# Patient Record
Sex: FEMALE | Race: BLACK OR AFRICAN AMERICAN | Employment: FULL TIME | URBAN - METROPOLITAN AREA
[De-identification: names, ages, dates, MRNs, and addresses within clinical notes are randomized per-mention and may not be internally consistent; named-entity substitution may affect disease eponyms.]

---

## 2018-10-12 ENCOUNTER — HOSPITAL ENCOUNTER (OUTPATIENT)
Age: 53
Discharge: HOME | End: 2018-10-12
Payer: MEDICARE

## 2018-10-12 LAB
ALBUMIN SERPL-MCNC: 4.1 G/DL (ref 3.5–5.2)
ALP BLD-CCNC: 69 U/L (ref 35–104)
ALT SERPL-CCNC: 22 U/L (ref 0–32)
ANION GAP SERPL CALCULATED.3IONS-SCNC: 19 MMOL/L (ref 7–16)
AST SERPL-CCNC: 14 U/L (ref 0–31)
BASOPHILS ABSOLUTE: 0.04 E9/L (ref 0–0.2)
BASOPHILS RELATIVE PERCENT: 0.4 % (ref 0–2)
BILIRUB SERPL-MCNC: 0.2 MG/DL (ref 0–1.2)
BUN BLDV-MCNC: 11 MG/DL (ref 6–20)
CALCIUM SERPL-MCNC: 9.2 MG/DL (ref 8.6–10.2)
CHLORIDE BLD-SCNC: 102 MMOL/L (ref 98–107)
CHOLESTEROL, TOTAL: 314 MG/DL (ref 0–199)
CO2: 21 MMOL/L (ref 22–29)
CREAT SERPL-MCNC: 0.8 MG/DL (ref 0.5–1)
EOSINOPHILS ABSOLUTE: 0.06 E9/L (ref 0.05–0.5)
EOSINOPHILS RELATIVE PERCENT: 0.6 % (ref 0–6)
GFR AFRICAN AMERICAN: >60
GFR NON-AFRICAN AMERICAN: >60 ML/MIN/1.73
GLUCOSE BLD-MCNC: 69 MG/DL (ref 74–109)
HCT VFR BLD CALC: 42.4 % (ref 34–48)
HDLC SERPL-MCNC: 39 MG/DL
HEMOGLOBIN: 13.3 G/DL (ref 11.5–15.5)
IMMATURE GRANULOCYTES #: 0.07 E9/L
IMMATURE GRANULOCYTES %: 0.7 % (ref 0–5)
LDL CHOLESTEROL CALCULATED: 211 MG/DL (ref 0–99)
LYMPHOCYTES ABSOLUTE: 2.6 E9/L (ref 1.5–4)
LYMPHOCYTES RELATIVE PERCENT: 24.3 % (ref 20–42)
MCH RBC QN AUTO: 26.6 PG (ref 26–35)
MCHC RBC AUTO-ENTMCNC: 31.4 % (ref 32–34.5)
MCV RBC AUTO: 84.8 FL (ref 80–99.9)
MONOCYTES ABSOLUTE: 1.14 E9/L (ref 0.1–0.95)
MONOCYTES RELATIVE PERCENT: 10.7 % (ref 2–12)
NEUTROPHILS ABSOLUTE: 6.79 E9/L (ref 1.8–7.3)
NEUTROPHILS RELATIVE PERCENT: 63.3 % (ref 43–80)
PDW BLD-RTO: 14.1 FL (ref 11.5–15)
PLATELET # BLD: 286 E9/L (ref 130–450)
PMV BLD AUTO: 9.9 FL (ref 7–12)
POTASSIUM SERPL-SCNC: 3.3 MMOL/L (ref 3.5–5)
RBC # BLD: 5 E12/L (ref 3.5–5.5)
SEDIMENTATION RATE, ERYTHROCYTE: 14 MM/HR (ref 0–20)
SODIUM BLD-SCNC: 142 MMOL/L (ref 132–146)
TOTAL PROTEIN: 7.2 G/DL (ref 6.4–8.3)
TRIGL SERPL-MCNC: 320 MG/DL (ref 0–149)
VITAMIN D 25-HYDROXY: 18 NG/ML (ref 30–100)
VLDLC SERPL CALC-MCNC: 64 MG/DL
WBC # BLD: 10.7 E9/L (ref 4.5–11.5)

## 2018-10-12 PROCEDURE — 82306 VITAMIN D 25 HYDROXY: CPT

## 2018-10-12 PROCEDURE — 80061 LIPID PANEL: CPT

## 2018-10-12 PROCEDURE — 85025 COMPLETE CBC W/AUTO DIFF WBC: CPT

## 2018-10-12 PROCEDURE — 36415 COLL VENOUS BLD VENIPUNCTURE: CPT

## 2018-10-12 PROCEDURE — 85651 RBC SED RATE NONAUTOMATED: CPT

## 2018-10-12 PROCEDURE — 80053 COMPREHEN METABOLIC PANEL: CPT

## 2019-01-31 ENCOUNTER — HOSPITAL ENCOUNTER (OUTPATIENT)
Age: 54
Discharge: HOME | End: 2019-02-02
Payer: MEDICARE

## 2019-01-31 PROCEDURE — 88304 TISSUE EXAM BY PATHOLOGIST: CPT

## 2019-01-31 PROCEDURE — 88311 DECALCIFY TISSUE: CPT

## 2019-02-04 ENCOUNTER — ANESTHESIA EVENT (OUTPATIENT)
Dept: ENDOSCOPY | Age: 54
End: 2019-02-04
Payer: COMMERCIAL

## 2019-02-05 ENCOUNTER — PREP FOR PROCEDURE (OUTPATIENT)
Dept: GASTROENTEROLOGY | Age: 54
End: 2019-02-05

## 2019-02-05 RX ORDER — 0.9 % SODIUM CHLORIDE 0.9 %
50 INTRAVENOUS SOLUTION INTRAVENOUS ONCE
Status: CANCELLED | OUTPATIENT
Start: 2019-02-05 | End: 2019-02-05

## 2019-02-06 ENCOUNTER — HOSPITAL ENCOUNTER (OUTPATIENT)
Age: 54
Setting detail: OUTPATIENT SURGERY
Discharge: HOME | End: 2019-02-06
Attending: INTERNAL MEDICINE | Admitting: INTERNAL MEDICINE
Payer: COMMERCIAL

## 2019-02-06 ENCOUNTER — ANESTHESIA (OUTPATIENT)
Dept: ENDOSCOPY | Age: 54
End: 2019-02-06
Payer: COMMERCIAL

## 2019-02-06 VITALS
RESPIRATION RATE: 27 BRPM | DIASTOLIC BLOOD PRESSURE: 103 MMHG | SYSTOLIC BLOOD PRESSURE: 177 MMHG | OXYGEN SATURATION: 99 %

## 2019-02-06 VITALS
DIASTOLIC BLOOD PRESSURE: 83 MMHG | RESPIRATION RATE: 18 BRPM | BODY MASS INDEX: 31.65 KG/M2 | SYSTOLIC BLOOD PRESSURE: 152 MMHG | HEART RATE: 99 BPM | OXYGEN SATURATION: 96 % | WEIGHT: 190 LBS | HEIGHT: 65 IN | TEMPERATURE: 97.2 F

## 2019-02-06 LAB — METER GLUCOSE: 106 MG/DL (ref 74–99)

## 2019-02-06 PROCEDURE — 7100000011 HC PHASE II RECOVERY - ADDTL 15 MIN: Performed by: INTERNAL MEDICINE

## 2019-02-06 PROCEDURE — 82962 GLUCOSE BLOOD TEST: CPT

## 2019-02-06 PROCEDURE — 2709999900 HC NON-CHARGEABLE SUPPLY: Performed by: INTERNAL MEDICINE

## 2019-02-06 PROCEDURE — 7100000010 HC PHASE II RECOVERY - FIRST 15 MIN: Performed by: INTERNAL MEDICINE

## 2019-02-06 PROCEDURE — 6360000002 HC RX W HCPCS: Performed by: NURSE ANESTHETIST, CERTIFIED REGISTERED

## 2019-02-06 PROCEDURE — 3609012400 HC EGD TRANSORAL BIOPSY SINGLE/MULTIPLE: Performed by: INTERNAL MEDICINE

## 2019-02-06 PROCEDURE — 3700000000 HC ANESTHESIA ATTENDED CARE: Performed by: INTERNAL MEDICINE

## 2019-02-06 PROCEDURE — 2580000003 HC RX 258: Performed by: NURSE ANESTHETIST, CERTIFIED REGISTERED

## 2019-02-06 PROCEDURE — 87081 CULTURE SCREEN ONLY: CPT

## 2019-02-06 RX ORDER — OXYCODONE AND ACETAMINOPHEN 7.5; 325 MG/1; MG/1
1 TABLET ORAL EVERY 4 HOURS PRN
COMMUNITY

## 2019-02-06 RX ORDER — 0.9 % SODIUM CHLORIDE 0.9 %
50 INTRAVENOUS SOLUTION INTRAVENOUS ONCE
Status: DISCONTINUED | OUTPATIENT
Start: 2019-02-06 | End: 2019-02-06 | Stop reason: HOSPADM

## 2019-02-06 RX ORDER — SODIUM CHLORIDE 0.9 % (FLUSH) 0.9 %
10 SYRINGE (ML) INJECTION EVERY 12 HOURS SCHEDULED
Status: DISCONTINUED | OUTPATIENT
Start: 2019-02-06 | End: 2019-02-06 | Stop reason: HOSPADM

## 2019-02-06 RX ORDER — PROPOFOL 10 MG/ML
INJECTION, EMULSION INTRAVENOUS PRN
Status: DISCONTINUED | OUTPATIENT
Start: 2019-02-06 | End: 2019-02-06 | Stop reason: SDUPTHER

## 2019-02-06 RX ORDER — SODIUM CHLORIDE 9 MG/ML
INJECTION, SOLUTION INTRAVENOUS CONTINUOUS PRN
Status: DISCONTINUED | OUTPATIENT
Start: 2019-02-06 | End: 2019-02-06 | Stop reason: SDUPTHER

## 2019-02-06 RX ORDER — SODIUM CHLORIDE 0.9 % (FLUSH) 0.9 %
10 SYRINGE (ML) INJECTION PRN
Status: DISCONTINUED | OUTPATIENT
Start: 2019-02-06 | End: 2019-02-06 | Stop reason: HOSPADM

## 2019-02-06 RX ADMIN — SODIUM CHLORIDE: 9 INJECTION, SOLUTION INTRAVENOUS at 14:19

## 2019-02-06 RX ADMIN — PROPOFOL 220 MG: 10 INJECTION, EMULSION INTRAVENOUS at 14:32

## 2019-02-06 ASSESSMENT — PAIN SCALES - GENERAL
PAINLEVEL_OUTOF10: 0

## 2019-02-06 ASSESSMENT — PAIN - FUNCTIONAL ASSESSMENT: PAIN_FUNCTIONAL_ASSESSMENT: 0-10

## 2019-02-08 LAB — CLOTEST: NORMAL

## 2019-02-13 ENCOUNTER — HOSPITAL ENCOUNTER (OUTPATIENT)
Age: 54
Discharge: HOME | End: 2019-02-13
Payer: COMMERCIAL

## 2019-02-13 LAB
AMYLASE: 42 U/L (ref 20–100)
BASOPHILS ABSOLUTE: 0.03 E9/L (ref 0–0.2)
BASOPHILS RELATIVE PERCENT: 0.3 % (ref 0–2)
EOSINOPHILS ABSOLUTE: 0.05 E9/L (ref 0.05–0.5)
EOSINOPHILS RELATIVE PERCENT: 0.5 % (ref 0–6)
HCT VFR BLD CALC: 43.4 % (ref 34–48)
HEMOGLOBIN: 13.7 G/DL (ref 11.5–15.5)
IMMATURE GRANULOCYTES #: 0.04 E9/L
IMMATURE GRANULOCYTES %: 0.4 % (ref 0–5)
LIPASE: 21 U/L (ref 13–60)
LYMPHOCYTES ABSOLUTE: 2.52 E9/L (ref 1.5–4)
LYMPHOCYTES RELATIVE PERCENT: 26.6 % (ref 20–42)
MCH RBC QN AUTO: 26.8 PG (ref 26–35)
MCHC RBC AUTO-ENTMCNC: 31.6 % (ref 32–34.5)
MCV RBC AUTO: 84.9 FL (ref 80–99.9)
MONOCYTES ABSOLUTE: 1.09 E9/L (ref 0.1–0.95)
MONOCYTES RELATIVE PERCENT: 11.5 % (ref 2–12)
NEUTROPHILS ABSOLUTE: 5.73 E9/L (ref 1.8–7.3)
NEUTROPHILS RELATIVE PERCENT: 60.7 % (ref 43–80)
PDW BLD-RTO: 14.1 FL (ref 11.5–15)
PLATELET # BLD: 280 E9/L (ref 130–450)
PMV BLD AUTO: 9.7 FL (ref 7–12)
RBC # BLD: 5.11 E12/L (ref 3.5–5.5)
WBC # BLD: 9.5 E9/L (ref 4.5–11.5)

## 2019-02-13 PROCEDURE — 80053 COMPREHEN METABOLIC PANEL: CPT

## 2019-02-13 PROCEDURE — 83690 ASSAY OF LIPASE: CPT

## 2019-02-13 PROCEDURE — 85025 COMPLETE CBC W/AUTO DIFF WBC: CPT

## 2019-02-13 PROCEDURE — 82150 ASSAY OF AMYLASE: CPT

## 2019-02-13 PROCEDURE — 36415 COLL VENOUS BLD VENIPUNCTURE: CPT

## 2019-02-18 LAB
ALBUMIN SERPL-MCNC: 4.2 G/DL (ref 3.5–5.2)
ALP BLD-CCNC: 71 U/L (ref 35–104)
ALT SERPL-CCNC: 16 U/L (ref 0–32)
ANION GAP SERPL CALCULATED.3IONS-SCNC: 15 MMOL/L (ref 7–16)
AST SERPL-CCNC: 17 U/L (ref 0–31)
BILIRUB SERPL-MCNC: 0.4 MG/DL (ref 0–1.2)
BUN BLDV-MCNC: 10 MG/DL (ref 6–20)
CALCIUM SERPL-MCNC: 9.6 MG/DL (ref 8.6–10.2)
CHLORIDE BLD-SCNC: 106 MMOL/L (ref 98–107)
CO2: 23 MMOL/L (ref 22–29)
CREAT SERPL-MCNC: 0.8 MG/DL (ref 0.5–1)
GFR AFRICAN AMERICAN: >60
GFR NON-AFRICAN AMERICAN: >60 ML/MIN/1.73
GLUCOSE BLD-MCNC: 66 MG/DL (ref 74–99)
POTASSIUM SERPL-SCNC: 4.7 MMOL/L (ref 3.5–5)
SODIUM BLD-SCNC: 144 MMOL/L (ref 132–146)
TOTAL PROTEIN: 7.5 G/DL (ref 6.4–8.3)

## 2019-02-20 ENCOUNTER — HOSPITAL ENCOUNTER (EMERGENCY)
Age: 54
Discharge: HOME | End: 2019-02-20
Attending: EMERGENCY MEDICINE
Payer: COMMERCIAL

## 2019-02-20 ENCOUNTER — APPOINTMENT (OUTPATIENT)
Dept: ULTRASOUND IMAGING | Age: 54
End: 2019-02-20
Payer: COMMERCIAL

## 2019-02-20 VITALS
DIASTOLIC BLOOD PRESSURE: 70 MMHG | HEIGHT: 65 IN | HEART RATE: 65 BPM | TEMPERATURE: 98.7 F | BODY MASS INDEX: 28.32 KG/M2 | OXYGEN SATURATION: 95 % | RESPIRATION RATE: 16 BRPM | SYSTOLIC BLOOD PRESSURE: 105 MMHG | WEIGHT: 170 LBS

## 2019-02-20 DIAGNOSIS — I82.411 ACUTE DEEP VEIN THROMBOSIS (DVT) OF FEMORAL VEIN OF RIGHT LOWER EXTREMITY (HCC): Primary | ICD-10-CM

## 2019-02-20 PROCEDURE — 93971 EXTREMITY STUDY: CPT

## 2019-02-20 PROCEDURE — 96372 THER/PROPH/DIAG INJ SC/IM: CPT

## 2019-02-20 PROCEDURE — 6360000002 HC RX W HCPCS: Performed by: NURSE PRACTITIONER

## 2019-02-20 PROCEDURE — 99283 EMERGENCY DEPT VISIT LOW MDM: CPT

## 2019-02-20 RX ADMIN — ENOXAPARIN SODIUM 80 MG: 100 INJECTION SUBCUTANEOUS at 19:07

## 2019-02-20 ASSESSMENT — PAIN SCALES - GENERAL: PAINLEVEL_OUTOF10: 10

## 2019-02-20 ASSESSMENT — PAIN DESCRIPTION - PAIN TYPE: TYPE: ACUTE PAIN

## 2019-03-07 ENCOUNTER — HOSPITAL ENCOUNTER (OUTPATIENT)
Dept: NUCLEAR MEDICINE | Age: 54
Discharge: HOME | End: 2019-03-07
Payer: COMMERCIAL

## 2019-03-07 VITALS — WEIGHT: 190 LBS | BODY MASS INDEX: 31.62 KG/M2

## 2019-03-07 DIAGNOSIS — R14.0 ABDOMINAL BLOATING: ICD-10-CM

## 2019-03-07 PROCEDURE — A9537 TC99M MEBROFENIN: HCPCS | Performed by: RADIOLOGY

## 2019-03-07 PROCEDURE — 2580000003 HC RX 258: Performed by: RADIOLOGY

## 2019-03-07 PROCEDURE — 78227 HEPATOBIL SYST IMAGE W/DRUG: CPT

## 2019-03-07 PROCEDURE — 6360000004 HC RX CONTRAST MEDICATION: Performed by: RADIOLOGY

## 2019-03-07 PROCEDURE — 3430000000 HC RX DIAGNOSTIC RADIOPHARMACEUTICAL: Performed by: RADIOLOGY

## 2019-03-07 RX ADMIN — Medication 6 MILLICURIE: at 10:33

## 2019-03-07 RX ADMIN — SODIUM CHLORIDE 1.72 MCG: 9 INJECTION, SOLUTION INTRAVENOUS at 11:39

## 2019-06-27 ENCOUNTER — HOSPITAL ENCOUNTER (OUTPATIENT)
Dept: NEUROLOGY | Age: 54
Discharge: HOME | End: 2019-06-27
Payer: COMMERCIAL

## 2019-06-27 VITALS — BODY MASS INDEX: 30.73 KG/M2 | HEIGHT: 64 IN | WEIGHT: 180 LBS

## 2019-06-27 PROCEDURE — 95886 MUSC TEST DONE W/N TEST COMP: CPT | Performed by: PHYSICAL MEDICINE & REHABILITATION

## 2019-06-27 PROCEDURE — 95912 NRV CNDJ TEST 11-12 STUDIES: CPT | Performed by: PHYSICAL MEDICINE & REHABILITATION

## 2019-06-27 PROCEDURE — 95886 MUSC TEST DONE W/N TEST COMP: CPT

## 2019-06-27 PROCEDURE — 95912 NRV CNDJ TEST 11-12 STUDIES: CPT

## 2019-06-27 NOTE — PROCEDURES
[address] [facility]  [address] [place] [address], [address] [place] [address]  Phone: [phone]  Fax: [phone]      Referring Provider: Vianney Mims DPM  Primary Care Physician: Serenity Singer MD  Patient Name: Karissa Brunner  Patient YOB: 1965  Gender: female  BMI: Body mass index is 30.9 kg/m². Height 5' 4\" (1.626 m), weight 180 lb (81.6 kg), not currently breastfeeding. 6/27/2019    Description of clinical problem:   Pain in the left foot, occasional paresthesias    Pain: Yes   ; Numbness/tingling: she reports occasional paresthesias; Weakness:  No  She endorses \"stiffness\" in the left foot     Brief physical exam:   Sensory deficit: numbness around surgical scars, otherwise SILT; Weakness: No; Atrophy: No      Study Limitations:  None    Motor NCS      Nerve / Sites Lat. Lat Diff Amplitude Amp. 1-2 Distance Velocity Temp.    ms ms mV % cm m/s °C   R Peroneal - EDB      Ankle 4.11  4.3 100 8  33.3      Pop fossa 12.86 8.75 4.0 94.1 38 43 33.3   L Peroneal - EDB      Ankle 4.32  4.2 100 8  33.1      Fib head 13.23 8.91 4.1 97.6 38 43 33   R Tibial - AH      Ankle 3.85  6.1 100 8  33.4      Pop fossa 12.66 8.80 4.3 70.5 40 45 33.4   L Tibial - AH      Ankle 3.96  6.0 100 8  33      Pop fossa 13.33 9.38 4.7 78.3 40 43 33       Sensory NCS      Nerve / Sites Onset Lat Peak Lat PP Amp Distance Velocity Temp.    ms ms µV cm m/s °C   R Superficial peroneal - Ankle      Lat leg 2.24 2.81 9.3 10 45 33.5   L Superficial peroneal - Ankle      Lat leg 2.45 2.97 3.7 10 41 33.1   R Sural - Ankle (Calf)      Calf 2.50 3.39 6.0 14 56 33.5   L Sural - Ankle (Calf)      Calf 2.19 2.81 5.7 14 64 33   R Medial plantar, Lateral plantar - Ankle (Medial, lateral sole)      Medial plantar Sole 2.92 3.59 4.7 14 48 32.9      Lateral plantar Sole 3.33 3.76 4.4 14 42 32.9         32.9   L Medial plantar, Lateral plantar - Ankle (Medial, lateral sole)      Medial plantar Sole 3.02 3.59 4.6 14 46 32.9      Lateral plantar Sole 3.33 3.75 4.5 14 42 32.9         32.9       F  Wave      Nerve F Lat M Lat F-M Lat    ms ms ms   R Peroneal - EDB 51.6 3.2 48.3   R Tibial - AH 48.4 4.8 43.6   L Peroneal - EDB 47.0 4.3 42.7   L Tibial - AH 48.0 5.1 42.9       H Reflex      Nerve Lat Hmax    ms   R Tibial - Soleus 28.0   L Tibial - Soleus 28.6       EMG         EMG Summary Table     Spontaneous MUAP Recruitment   Muscle IA Fib PSW Fasc H.F. Amp Dur. PPP Pattern   L. Tibialis anterior N None None None None N N N N   L. Gastrocnemius (Medial head) N None None None None N N N N   L. Extensor hallucis longus N None None None None N N N N   L. Peroneus longus N None None None None N N N N   L. Vastus medialis N None None None None N N N N   L. Lumbar paraspinals (mid) N None None None None       L. Lumbar paraspinals (low) N None None None None                 Summary of Findings:   Nerve conduction studies:   Sensory nerve conduction study of the left superficial peroneal nerve revealed normal distal latency and decreased SNAP amplitude. Sensory nerve conduction studies of the right superficial peroneal, bilateral sural, and bilateral medial/lateral plantar nerves showed normal distal latencies and normal SNAP amplitudes. Motor nerve conduction studies of the bilateral tibial and common peroneal nerves showed normal distal latencies, normal CMAP amplitudes, and normal conduction velocities. F-wave studies of the bilateral tibial and peroneal nerves revealed normal latencies. Bilateral tibial nerve H-reflex responses were normal.      Needle EMG:   · Needle EMG was performed using a monopolar needle. All muscles tested, as listed in the table above, demonstrated normal amplitude, duration, phases and recruitment, without evidence of active denervation. Diagnostic Interpretation:      This study was Abnormal.     1. There is electrodiagnostic evidence consistent with non-localizable left superficial peroneal sensory neuropathy, as indicated by decreased superficial sensory NCS amplitude. There is no electrodiagnostic evidence of motor fiber involvement. Clinical correlation is advised. 2. There is no electrodiagnostic evidence of any other peripheral nerve mononeuropathy, plexopathy, left lumbar motor radiculopathy or peripheral polyneuropathy in the bilateral lower extremities. Cannot evaluate for right lumbar radiculopathy without completion of needle exam of the right lower extremity. EMG is not able to evaluate for pure sensory radiculopathy or small fiber neuropathy. Previous Study: No prior study available      Follow up EMG can be completed in the future if clinically indicated    Technologist: Ember Ku    Physician:  Pako Lebron MD  Physical Medicine and Rehabilitation    Nerve conduction studies and electromyography were performed according to our laboratory policies and procedures which can be provided upon request. All abnormal values are identified in the table.  Laboratory normal values can also be provided upon request.       Cc: WYATT Arzate MD

## 2019-07-30 ENCOUNTER — OFFICE VISIT (OUTPATIENT)
Dept: PHYSICAL MEDICINE AND REHAB | Age: 54
End: 2019-07-30
Payer: COMMERCIAL

## 2019-07-30 VITALS
WEIGHT: 180 LBS | HEIGHT: 64 IN | OXYGEN SATURATION: 98 % | HEART RATE: 88 BPM | DIASTOLIC BLOOD PRESSURE: 70 MMHG | SYSTOLIC BLOOD PRESSURE: 110 MMHG | BODY MASS INDEX: 30.73 KG/M2

## 2019-07-30 DIAGNOSIS — R20.0 NUMBNESS IN LEFT LEG: ICD-10-CM

## 2019-07-30 DIAGNOSIS — G57.32 SUPERFICIAL PERONEAL NERVE NEUROPATHY, LEFT: ICD-10-CM

## 2019-07-30 DIAGNOSIS — M79.672 LEFT FOOT PAIN: Primary | ICD-10-CM

## 2019-07-30 PROCEDURE — 99244 OFF/OP CNSLTJ NEW/EST MOD 40: CPT | Performed by: PHYSICAL MEDICINE & REHABILITATION

## 2019-07-30 RX ORDER — DESONIDE 0.5 MG/G
OINTMENT TOPICAL
Refills: 0 | COMMUNITY
Start: 2019-06-24

## 2019-07-30 RX ORDER — PANTOPRAZOLE SODIUM 40 MG/1
TABLET, DELAYED RELEASE ORAL
Refills: 0 | COMMUNITY
Start: 2019-06-24
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
## 2019-09-11 ENCOUNTER — HOSPITAL ENCOUNTER (OUTPATIENT)
Age: 54
Discharge: HOME | End: 2019-09-13
Payer: COMMERCIAL

## 2019-09-11 ENCOUNTER — HOSPITAL ENCOUNTER (OUTPATIENT)
Dept: GENERAL RADIOLOGY | Age: 54
Discharge: HOME | End: 2019-09-13
Payer: COMMERCIAL

## 2019-09-11 DIAGNOSIS — R52 PAIN: ICD-10-CM

## 2019-09-11 PROCEDURE — 73030 X-RAY EXAM OF SHOULDER: CPT

## 2020-01-31 ENCOUNTER — HOSPITAL ENCOUNTER (OUTPATIENT)
Age: 55
Setting detail: OBSERVATION
Discharge: HOME | End: 2020-02-02
Attending: EMERGENCY MEDICINE | Admitting: INTERNAL MEDICINE
Payer: COMMERCIAL

## 2020-01-31 ENCOUNTER — APPOINTMENT (OUTPATIENT)
Dept: CT IMAGING | Age: 55
End: 2020-01-31
Payer: COMMERCIAL

## 2020-01-31 ENCOUNTER — APPOINTMENT (OUTPATIENT)
Dept: GENERAL RADIOLOGY | Age: 55
End: 2020-01-31
Payer: COMMERCIAL

## 2020-01-31 PROBLEM — R07.9 CHEST PAIN: Status: ACTIVE | Noted: 2020-01-31

## 2020-01-31 LAB
ANION GAP SERPL CALCULATED.3IONS-SCNC: 14 MMOL/L (ref 7–16)
APTT: 29.5 SEC (ref 24.5–35.1)
BASOPHILS ABSOLUTE: 0.05 E9/L (ref 0–0.2)
BASOPHILS RELATIVE PERCENT: 0.4 % (ref 0–2)
BUN BLDV-MCNC: 10 MG/DL (ref 6–20)
C-REACTIVE PROTEIN: 1.1 MG/DL (ref 0–0.4)
CALCIUM SERPL-MCNC: 9.5 MG/DL (ref 8.6–10.2)
CHLORIDE BLD-SCNC: 103 MMOL/L (ref 98–107)
CO2: 20 MMOL/L (ref 22–29)
CREAT SERPL-MCNC: 0.8 MG/DL (ref 0.5–1)
EKG ATRIAL RATE: 98 BPM
EKG P AXIS: 55 DEGREES
EKG P-R INTERVAL: 148 MS
EKG Q-T INTERVAL: 362 MS
EKG QRS DURATION: 70 MS
EKG QTC CALCULATION (BAZETT): 462 MS
EKG R AXIS: -53 DEGREES
EKG T AXIS: 45 DEGREES
EKG VENTRICULAR RATE: 98 BPM
EOSINOPHILS ABSOLUTE: 0.02 E9/L (ref 0.05–0.5)
EOSINOPHILS RELATIVE PERCENT: 0.2 % (ref 0–6)
GFR AFRICAN AMERICAN: >60
GFR NON-AFRICAN AMERICAN: >60 ML/MIN/1.73
GLUCOSE BLD-MCNC: 165 MG/DL (ref 74–99)
HCT VFR BLD CALC: 40.5 % (ref 34–48)
HEMOGLOBIN: 12.8 G/DL (ref 11.5–15.5)
IMMATURE GRANULOCYTES #: 0.24 E9/L
IMMATURE GRANULOCYTES %: 1.9 % (ref 0–5)
INR BLD: 1
LYMPHOCYTES ABSOLUTE: 1.34 E9/L (ref 1.5–4)
LYMPHOCYTES RELATIVE PERCENT: 10.4 % (ref 20–42)
MCH RBC QN AUTO: 26.9 PG (ref 26–35)
MCHC RBC AUTO-ENTMCNC: 31.6 % (ref 32–34.5)
MCV RBC AUTO: 85.3 FL (ref 80–99.9)
MONOCYTES ABSOLUTE: 0.64 E9/L (ref 0.1–0.95)
MONOCYTES RELATIVE PERCENT: 5 % (ref 2–12)
NEUTROPHILS ABSOLUTE: 10.59 E9/L (ref 1.8–7.3)
NEUTROPHILS RELATIVE PERCENT: 82.1 % (ref 43–80)
PDW BLD-RTO: 14.1 FL (ref 11.5–15)
PLATELET # BLD: 328 E9/L (ref 130–450)
PMV BLD AUTO: 9.5 FL (ref 7–12)
POTASSIUM REFLEX MAGNESIUM: 3.9 MMOL/L (ref 3.5–5)
PRO-BNP: 266 PG/ML (ref 0–125)
PROTHROMBIN TIME: 12.4 SEC (ref 9.3–12.4)
RBC # BLD: 4.75 E12/L (ref 3.5–5.5)
SEDIMENTATION RATE, ERYTHROCYTE: 15 MM/HR (ref 0–20)
SODIUM BLD-SCNC: 137 MMOL/L (ref 132–146)
TROPONIN: 0.46 NG/ML (ref 0–0.03)
TROPONIN: 0.52 NG/ML (ref 0–0.03)
WBC # BLD: 12.9 E9/L (ref 4.5–11.5)

## 2020-01-31 PROCEDURE — G0378 HOSPITAL OBSERVATION PER HR: HCPCS

## 2020-01-31 PROCEDURE — 85025 COMPLETE CBC W/AUTO DIFF WBC: CPT

## 2020-01-31 PROCEDURE — 85651 RBC SED RATE NONAUTOMATED: CPT

## 2020-01-31 PROCEDURE — 2580000003 HC RX 258: Performed by: PHYSICIAN ASSISTANT

## 2020-01-31 PROCEDURE — 6360000004 HC RX CONTRAST MEDICATION: Performed by: RADIOLOGY

## 2020-01-31 PROCEDURE — 83880 ASSAY OF NATRIURETIC PEPTIDE: CPT

## 2020-01-31 PROCEDURE — 36415 COLL VENOUS BLD VENIPUNCTURE: CPT

## 2020-01-31 PROCEDURE — 80048 BASIC METABOLIC PNL TOTAL CA: CPT

## 2020-01-31 PROCEDURE — 71275 CT ANGIOGRAPHY CHEST: CPT

## 2020-01-31 PROCEDURE — 93010 ELECTROCARDIOGRAM REPORT: CPT | Performed by: INTERNAL MEDICINE

## 2020-01-31 PROCEDURE — 85610 PROTHROMBIN TIME: CPT

## 2020-01-31 PROCEDURE — 86140 C-REACTIVE PROTEIN: CPT

## 2020-01-31 PROCEDURE — 2580000003 HC RX 258: Performed by: RADIOLOGY

## 2020-01-31 PROCEDURE — 6370000000 HC RX 637 (ALT 250 FOR IP): Performed by: PHYSICIAN ASSISTANT

## 2020-01-31 PROCEDURE — 99285 EMERGENCY DEPT VISIT HI MDM: CPT

## 2020-01-31 PROCEDURE — 84484 ASSAY OF TROPONIN QUANT: CPT

## 2020-01-31 PROCEDURE — 96374 THER/PROPH/DIAG INJ IV PUSH: CPT

## 2020-01-31 PROCEDURE — 71045 X-RAY EXAM CHEST 1 VIEW: CPT

## 2020-01-31 PROCEDURE — 6360000002 HC RX W HCPCS: Performed by: EMERGENCY MEDICINE

## 2020-01-31 PROCEDURE — 96375 TX/PRO/DX INJ NEW DRUG ADDON: CPT

## 2020-01-31 PROCEDURE — 93005 ELECTROCARDIOGRAM TRACING: CPT | Performed by: EMERGENCY MEDICINE

## 2020-01-31 PROCEDURE — 85730 THROMBOPLASTIN TIME PARTIAL: CPT

## 2020-01-31 RX ORDER — DIPHENHYDRAMINE HYDROCHLORIDE 50 MG/ML
25 INJECTION INTRAMUSCULAR; INTRAVENOUS ONCE
Status: COMPLETED | OUTPATIENT
Start: 2020-01-31 | End: 2020-01-31

## 2020-01-31 RX ORDER — HYDROCODONE BITARTRATE AND ACETAMINOPHEN 7.5; 325 MG/1; MG/1
1 TABLET ORAL EVERY 6 HOURS PRN
Status: DISCONTINUED | OUTPATIENT
Start: 2020-01-31 | End: 2020-01-31

## 2020-01-31 RX ORDER — OXYCODONE HYDROCHLORIDE AND ACETAMINOPHEN 5; 325 MG/1; MG/1
1.5 TABLET ORAL EVERY 6 HOURS PRN
Status: DISCONTINUED | OUTPATIENT
Start: 2020-01-31 | End: 2020-02-02 | Stop reason: HOSPADM

## 2020-01-31 RX ORDER — ONDANSETRON 2 MG/ML
4 INJECTION INTRAMUSCULAR; INTRAVENOUS EVERY 6 HOURS PRN
Status: DISCONTINUED | OUTPATIENT
Start: 2020-01-31 | End: 2020-02-02 | Stop reason: HOSPADM

## 2020-01-31 RX ORDER — FUROSEMIDE 10 MG/ML
20 INJECTION INTRAMUSCULAR; INTRAVENOUS ONCE
Status: COMPLETED | OUTPATIENT
Start: 2020-01-31 | End: 2020-01-31

## 2020-01-31 RX ORDER — OXYCODONE HYDROCHLORIDE AND ACETAMINOPHEN 5; 325 MG/1; MG/1
1 TABLET ORAL EVERY 6 HOURS PRN
Status: DISCONTINUED | OUTPATIENT
Start: 2020-01-31 | End: 2020-01-31

## 2020-01-31 RX ORDER — SODIUM CHLORIDE 0.9 % (FLUSH) 0.9 %
10 SYRINGE (ML) INJECTION EVERY 12 HOURS SCHEDULED
Status: DISCONTINUED | OUTPATIENT
Start: 2020-01-31 | End: 2020-02-02 | Stop reason: HOSPADM

## 2020-01-31 RX ORDER — PANTOPRAZOLE SODIUM 40 MG/1
40 TABLET, DELAYED RELEASE ORAL
Status: DISCONTINUED | OUTPATIENT
Start: 2020-02-01 | End: 2020-02-02 | Stop reason: HOSPADM

## 2020-01-31 RX ORDER — ASPIRIN 81 MG/1
81 TABLET, CHEWABLE ORAL DAILY
Status: DISCONTINUED | OUTPATIENT
Start: 2020-02-01 | End: 2020-02-02 | Stop reason: HOSPADM

## 2020-01-31 RX ORDER — METOPROLOL SUCCINATE 25 MG/1
25 TABLET, EXTENDED RELEASE ORAL DAILY
Status: DISCONTINUED | OUTPATIENT
Start: 2020-02-01 | End: 2020-02-02 | Stop reason: HOSPADM

## 2020-01-31 RX ORDER — AZATHIOPRINE 50 MG/1
50 TABLET ORAL DAILY
Status: DISCONTINUED | OUTPATIENT
Start: 2020-02-01 | End: 2020-02-02 | Stop reason: HOSPADM

## 2020-01-31 RX ORDER — PREDNISONE 1 MG/1
10 TABLET ORAL DAILY
Status: DISCONTINUED | OUTPATIENT
Start: 2020-02-01 | End: 2020-02-02 | Stop reason: HOSPADM

## 2020-01-31 RX ORDER — POTASSIUM CHLORIDE 7.45 MG/ML
10 INJECTION INTRAVENOUS PRN
Status: DISCONTINUED | OUTPATIENT
Start: 2020-01-31 | End: 2020-02-02 | Stop reason: HOSPADM

## 2020-01-31 RX ORDER — ACETAMINOPHEN 325 MG/1
650 TABLET ORAL EVERY 4 HOURS PRN
Status: DISCONTINUED | OUTPATIENT
Start: 2020-01-31 | End: 2020-02-02 | Stop reason: HOSPADM

## 2020-01-31 RX ORDER — NITROGLYCERIN 0.4 MG/1
0.4 TABLET SUBLINGUAL EVERY 5 MIN PRN
Status: DISCONTINUED | OUTPATIENT
Start: 2020-01-31 | End: 2020-02-02 | Stop reason: HOSPADM

## 2020-01-31 RX ORDER — SODIUM CHLORIDE 0.9 % (FLUSH) 0.9 %
10 SYRINGE (ML) INJECTION ONCE
Status: COMPLETED | OUTPATIENT
Start: 2020-01-31 | End: 2020-01-31

## 2020-01-31 RX ORDER — ATORVASTATIN CALCIUM 40 MG/1
80 TABLET, FILM COATED ORAL NIGHTLY
Status: DISCONTINUED | OUTPATIENT
Start: 2020-01-31 | End: 2020-02-02 | Stop reason: HOSPADM

## 2020-01-31 RX ORDER — OXYCODONE HYDROCHLORIDE 5 MG/1
2.5 TABLET ORAL EVERY 6 HOURS PRN
Status: DISCONTINUED | OUTPATIENT
Start: 2020-01-31 | End: 2020-01-31

## 2020-01-31 RX ORDER — SODIUM CHLORIDE 0.9 % (FLUSH) 0.9 %
10 SYRINGE (ML) INJECTION PRN
Status: DISCONTINUED | OUTPATIENT
Start: 2020-01-31 | End: 2020-02-02 | Stop reason: HOSPADM

## 2020-01-31 RX ORDER — POTASSIUM CHLORIDE 20 MEQ/1
40 TABLET, EXTENDED RELEASE ORAL PRN
Status: DISCONTINUED | OUTPATIENT
Start: 2020-01-31 | End: 2020-02-02 | Stop reason: HOSPADM

## 2020-01-31 RX ADMIN — Medication 10 ML: at 23:17

## 2020-01-31 RX ADMIN — FUROSEMIDE 20 MG: 10 INJECTION, SOLUTION INTRAVENOUS at 19:41

## 2020-01-31 RX ADMIN — IOPAMIDOL 70 ML: 755 INJECTION, SOLUTION INTRAVENOUS at 17:44

## 2020-01-31 RX ADMIN — DIPHENHYDRAMINE HYDROCHLORIDE 25 MG: 50 INJECTION, SOLUTION INTRAMUSCULAR; INTRAVENOUS at 18:19

## 2020-01-31 RX ADMIN — OXYCODONE HYDROCHLORIDE AND ACETAMINOPHEN 1.5 TABLET: 5; 325 TABLET ORAL at 23:14

## 2020-01-31 RX ADMIN — TICAGRELOR 90 MG: 90 TABLET ORAL at 23:14

## 2020-01-31 RX ADMIN — Medication 10 ML: at 17:44

## 2020-01-31 RX ADMIN — ATORVASTATIN CALCIUM 80 MG: 40 TABLET, FILM COATED ORAL at 22:19

## 2020-01-31 ASSESSMENT — PAIN SCALES - GENERAL
PAINLEVEL_OUTOF10: 6
PAINLEVEL_OUTOF10: 8

## 2020-01-31 ASSESSMENT — PAIN DESCRIPTION - PROGRESSION: CLINICAL_PROGRESSION: NOT CHANGED

## 2020-01-31 ASSESSMENT — PAIN DESCRIPTION - FREQUENCY: FREQUENCY: INTERMITTENT

## 2020-01-31 ASSESSMENT — PAIN DESCRIPTION - DESCRIPTORS: DESCRIPTORS: ACHING;DISCOMFORT;DULL

## 2020-01-31 ASSESSMENT — PAIN DESCRIPTION - LOCATION
LOCATION: HEAD
LOCATION: CHEST

## 2020-01-31 ASSESSMENT — PAIN DESCRIPTION - ONSET: ONSET: GRADUAL

## 2020-01-31 ASSESSMENT — PAIN DESCRIPTION - PAIN TYPE
TYPE: ACUTE PAIN
TYPE: ACUTE PAIN

## 2020-01-31 NOTE — ED PROVIDER NOTES
CBC Auto Differential   Result Value Ref Range    WBC 12.9 (H) 4.5 - 11.5 E9/L    RBC 4.75 3.50 - 5.50 E12/L    Hemoglobin 12.8 11.5 - 15.5 g/dL    Hematocrit 40.5 34.0 - 48.0 %    MCV 85.3 80.0 - 99.9 fL    MCH 26.9 26.0 - 35.0 pg    MCHC 31.6 (L) 32.0 - 34.5 %    RDW 14.1 11.5 - 15.0 fL    Platelets 839 801 - 402 E9/L    MPV 9.5 7.0 - 12.0 fL    Neutrophils % 82.1 (H) 43.0 - 80.0 %    Immature Granulocytes % 1.9 0.0 - 5.0 %    Lymphocytes % 10.4 (L) 20.0 - 42.0 %    Monocytes % 5.0 2.0 - 12.0 %    Eosinophils % 0.2 0.0 - 6.0 %    Basophils % 0.4 0.0 - 2.0 %    Neutrophils Absolute 10.59 (H) 1.80 - 7.30 E9/L    Immature Granulocytes # 0.24 E9/L    Lymphocytes Absolute 1.34 (L) 1.50 - 4.00 E9/L    Monocytes Absolute 0.64 0.10 - 0.95 E9/L    Eosinophils Absolute 0.02 (L) 0.05 - 0.50 E9/L    Basophils Absolute 0.05 0.00 - 0.20 M6/H   Basic Metabolic Panel w/ Reflex to MG   Result Value Ref Range    Sodium 137 132 - 146 mmol/L    Potassium reflex Magnesium 3.9 3.5 - 5.0 mmol/L    Chloride 103 98 - 107 mmol/L    CO2 20 (L) 22 - 29 mmol/L    Anion Gap 14 7 - 16 mmol/L    Glucose 165 (H) 74 - 99 mg/dL    BUN 10 6 - 20 mg/dL    CREATININE 0.8 0.5 - 1.0 mg/dL    GFR Non-African American >60 >=60 mL/min/1.73    GFR African American >60     Calcium 9.5 8.6 - 10.2 mg/dL   Troponin   Result Value Ref Range    Troponin 0.52 (H) 0.00 - 0.03 ng/mL   Brain Natriuretic Peptide   Result Value Ref Range    Pro- (H) 0 - 125 pg/mL   Protime-INR   Result Value Ref Range    Protime 12.4 9.3 - 12.4 sec    INR 1.0    APTT   Result Value Ref Range    aPTT 29.5 24.5 - 35.1 sec   C-reactive protein   Result Value Ref Range    CRP 1.1 (H) 0.0 - 0.4 mg/dL   Sedimentation Rate   Result Value Ref Range    Sed Rate 15 0 - 20 mm/Hr   Troponin   Result Value Ref Range    Troponin 0.46 (H) 0.00 - 0.03 ng/mL   EKG 12 Lead   Result Value Ref Range    Ventricular Rate 98 BPM    Atrial Rate 98 BPM    P-R Interval 148 ms    QRS Duration 70 ms Q-T Interval 362 ms    QTc Calculation (Bazett) 462 ms    P Axis 55 degrees    R Axis -53 degrees    T Axis 45 degrees       RADIOLOGY:  Interpreted by Radiologist.  CTA CHEST W CONTRAST   Final Result   No central pulmonary embolism, aortic dissection or pulmonary   infiltrates. Coronary artery calcifications. XR CHEST PORTABLE   Final Result      No airspace opacities or pleural effusion.                ------------------------- NURSING NOTES AND VITALS REVIEWED ---------------------------   The nursing notes within the ED encounter and vital signs as below have been reviewed. /83   Pulse 100   Temp 98 °F (36.7 °C) (Oral)   Resp 16   Ht 5' 4\" (1.626 m)   Wt 193 lb (87.5 kg)   SpO2 96%   BMI 33.13 kg/m²   Oxygen Saturation Interpretation: Normal      ---------------------------------------------------PHYSICAL EXAM--------------------------------------      Constitutional/General: Alert and oriented x3, well appearing, non toxic in NAD  Head: Normocephalic and atraumatic  Eyes: PERRL, EOMI  Mouth: Oropharynx clear, handling secretions, no trismus  Neck: Supple, full ROM,   Pulmonary: Lungs clear to auscultation bilaterally, no wheezes, rales, or rhonchi. Not in respiratory distress  Cardiovascular:  Regular rate and rhythm, no murmurs, gallops, or rubs. 2+ radial, DP, and PT pulses  Abdomen: Soft, non tender, non distended,   Extremities: Moves all extremities x 4. Warm and well perfused. No LE edema. No calf tenderness.   Skin: warm and dry without rash  Neurologic: GCS 15,  Psych: Normal Affect      ------------------------------ ED COURSE/MEDICAL DECISION MAKING----------------------  Medications   sodium chloride flush 0.9 % injection 10 mL (10 mLs Intravenous Given 1/31/20 3795)   sodium chloride flush 0.9 % injection 10 mL (has no administration in time range)   magnesium hydroxide (MILK OF MAGNESIA) 400 MG/5ML suspension 30 mL (has no administration in time range)   ondansetron [facility]) injection 4 mg (has no administration in time range)   nitroGLYCERIN (NITROSTAT) SL tablet 0.4 mg (has no administration in time range)   potassium chloride (KLOR-CON M) extended release tablet 40 mEq (has no administration in time range)     Or   potassium bicarb-citric acid (EFFER-K) effervescent tablet 40 mEq (has no administration in time range)     Or   potassium chloride 10 mEq/100 mL IVPB (Peripheral Line) (has no administration in time range)   acetaminophen (TYLENOL) tablet 650 mg (has no administration in time range)   aspirin chewable tablet 81 mg (has no administration in time range)   atorvastatin (LIPITOR) tablet 80 mg (80 mg Oral Given 1/31/20 2219)   metoprolol succinate (TOPROL XL) extended release tablet 25 mg (has no administration in time range)   ticagrelor (BRILINTA) tablet 90 mg (90 mg Oral Given 1/31/20 2314)   azaTHIOprine (IMURAN) tablet 50 mg (has no administration in time range)   pantoprazole (PROTONIX) tablet 40 mg (has no administration in time range)   predniSONE (DELTASONE) tablet 10 mg (has no administration in time range)   oxyCODONE-acetaminophen (PERCOCET) 5-325 MG per tablet 1.5 tablet (1.5 tablets Oral Given 1/31/20 2314)   iopamidol (ISOVUE-370) 76 % injection 70 mL (70 mLs Intravenous Given 1/31/20 1744)   sodium chloride flush 0.9 % injection 10 mL (10 mLs Intravenous Given 1/31/20 1744)   diphenhydrAMINE (BENADRYL) injection 25 mg (25 mg Intravenous Given 1/31/20 1819)   furosemide (LASIX) injection 20 mg (20 mg Intravenous Given 1/31/20 1941)       Medical Decision Making/ED COURSE:   Patient is a 40-year-old female with recent cardiac cath s/p stent placement x3 in [place], [place] 5 days ago for STEMI presenting with shortness of breath and pleuritic chest pain. She was HDS and well-appearing in the ED. She was saturating 97% on room air. She was placed on the cardiac monitor. No acute changes on EKG. Labs and CXR ordered.  Patient found to have mild troponin elevation

## 2020-02-01 PROBLEM — I25.10 CAD (CORONARY ARTERY DISEASE), NATIVE CORONARY ARTERY: Chronic | Status: ACTIVE | Noted: 2020-02-01

## 2020-02-01 PROBLEM — E66.9 OBESITY (BMI 30-39.9): Chronic | Status: ACTIVE | Noted: 2020-02-01

## 2020-02-01 PROBLEM — E78.5 HYPERLIPIDEMIA LDL GOAL <100: Chronic | Status: ACTIVE | Noted: 2020-02-01

## 2020-02-01 LAB
ANION GAP SERPL CALCULATED.3IONS-SCNC: 12 MMOL/L (ref 7–16)
BUN BLDV-MCNC: 12 MG/DL (ref 6–20)
CALCIUM SERPL-MCNC: 9.4 MG/DL (ref 8.6–10.2)
CHLORIDE BLD-SCNC: 102 MMOL/L (ref 98–107)
CHOLESTEROL, TOTAL: 221 MG/DL (ref 0–199)
CO2: 25 MMOL/L (ref 22–29)
CREAT SERPL-MCNC: 0.9 MG/DL (ref 0.5–1)
GFR AFRICAN AMERICAN: >60
GFR NON-AFRICAN AMERICAN: >60 ML/MIN/1.73
GLUCOSE BLD-MCNC: 88 MG/DL (ref 74–99)
HCT VFR BLD CALC: 42.6 % (ref 34–48)
HDLC SERPL-MCNC: 34 MG/DL
HEMOGLOBIN: 13.4 G/DL (ref 11.5–15.5)
LDL CHOLESTEROL CALCULATED: 156 MG/DL (ref 0–99)
LV EF: 65 %
LVEF MODALITY: NORMAL
MAGNESIUM: 2 MG/DL (ref 1.6–2.6)
MCH RBC QN AUTO: 27 PG (ref 26–35)
MCHC RBC AUTO-ENTMCNC: 31.5 % (ref 32–34.5)
MCV RBC AUTO: 85.9 FL (ref 80–99.9)
PDW BLD-RTO: 14.4 FL (ref 11.5–15)
PLATELET # BLD: 310 E9/L (ref 130–450)
PMV BLD AUTO: 9.9 FL (ref 7–12)
POTASSIUM REFLEX MAGNESIUM: 3.4 MMOL/L (ref 3.5–5)
RBC # BLD: 4.96 E12/L (ref 3.5–5.5)
SODIUM BLD-SCNC: 139 MMOL/L (ref 132–146)
TRIGL SERPL-MCNC: 155 MG/DL (ref 0–149)
TROPONIN: 0.51 NG/ML (ref 0–0.03)
VLDLC SERPL CALC-MCNC: 31 MG/DL
WBC # BLD: 9 E9/L (ref 4.5–11.5)

## 2020-02-01 PROCEDURE — G0378 HOSPITAL OBSERVATION PER HR: HCPCS

## 2020-02-01 PROCEDURE — 93306 TTE W/DOPPLER COMPLETE: CPT

## 2020-02-01 PROCEDURE — 84484 ASSAY OF TROPONIN QUANT: CPT

## 2020-02-01 PROCEDURE — 36415 COLL VENOUS BLD VENIPUNCTURE: CPT

## 2020-02-01 PROCEDURE — 99204 OFFICE O/P NEW MOD 45 MIN: CPT | Performed by: INTERNAL MEDICINE

## 2020-02-01 PROCEDURE — 2580000003 HC RX 258: Performed by: PHYSICIAN ASSISTANT

## 2020-02-01 PROCEDURE — 80048 BASIC METABOLIC PNL TOTAL CA: CPT

## 2020-02-01 PROCEDURE — 80061 LIPID PANEL: CPT

## 2020-02-01 PROCEDURE — 85027 COMPLETE CBC AUTOMATED: CPT

## 2020-02-01 PROCEDURE — 6360000002 HC RX W HCPCS: Performed by: PHYSICIAN ASSISTANT

## 2020-02-01 PROCEDURE — 83735 ASSAY OF MAGNESIUM: CPT

## 2020-02-01 PROCEDURE — 6370000000 HC RX 637 (ALT 250 FOR IP): Performed by: PHYSICIAN ASSISTANT

## 2020-02-01 RX ADMIN — TICAGRELOR 90 MG: 90 TABLET ORAL at 08:20

## 2020-02-01 RX ADMIN — POTASSIUM CHLORIDE 40 MEQ: 20 TABLET, EXTENDED RELEASE ORAL at 14:54

## 2020-02-01 RX ADMIN — TICAGRELOR 90 MG: 90 TABLET ORAL at 20:00

## 2020-02-01 RX ADMIN — ASPIRIN 81 MG 81 MG: 81 TABLET ORAL at 08:20

## 2020-02-01 RX ADMIN — AZATHIOPRINE 50 MG: 50 TABLET ORAL at 08:20

## 2020-02-01 RX ADMIN — OXYCODONE HYDROCHLORIDE AND ACETAMINOPHEN 1.5 TABLET: 5; 325 TABLET ORAL at 17:45

## 2020-02-01 RX ADMIN — PREDNISONE 10 MG: 5 TABLET ORAL at 08:20

## 2020-02-01 RX ADMIN — Medication 10 ML: at 20:00

## 2020-02-01 RX ADMIN — METOPROLOL SUCCINATE 25 MG: 25 TABLET, FILM COATED, EXTENDED RELEASE ORAL at 08:20

## 2020-02-01 RX ADMIN — ATORVASTATIN CALCIUM 80 MG: 40 TABLET, FILM COATED ORAL at 19:59

## 2020-02-01 RX ADMIN — PANTOPRAZOLE SODIUM 40 MG: 40 TABLET, DELAYED RELEASE ORAL at 06:40

## 2020-02-01 RX ADMIN — Medication 10 ML: at 08:24

## 2020-02-01 RX ADMIN — OXYCODONE HYDROCHLORIDE AND ACETAMINOPHEN 1.5 TABLET: 5; 325 TABLET ORAL at 08:23

## 2020-02-01 ASSESSMENT — PAIN DESCRIPTION - ORIENTATION: ORIENTATION: MID

## 2020-02-01 ASSESSMENT — PAIN SCALES - GENERAL
PAINLEVEL_OUTOF10: 0
PAINLEVEL_OUTOF10: 9
PAINLEVEL_OUTOF10: 8
PAINLEVEL_OUTOF10: 9
PAINLEVEL_OUTOF10: 5

## 2020-02-01 ASSESSMENT — PAIN DESCRIPTION - LOCATION: LOCATION: CHEST

## 2020-02-01 ASSESSMENT — PAIN DESCRIPTION - PAIN TYPE: TYPE: ACUTE PAIN

## 2020-02-01 NOTE — CARE COORDINATION
Social work / Discharge Planning:       Social work consult noted regarding \"dc planning\"       Social work met with patient for initial assessment. She lives alone and uses no DME. Patient reports that she is independent and works. She has no history of HC or YOSHI. Patient is currently on room air. She anticipates discharge home. Social work will follow.   Electronically signed by ARGENIS Arrieta on 2/1/2020 at 12:01 PM

## 2020-02-01 NOTE — H&P
[address] [facility]  Attending History and Physical      CHIEF COMPLAINT:  Chest pain and shortness of breath      HISTORY OF PRESENT ILLNESS:      The patient is a 47 y.o. female patient of dr Rowan Treviño who presents with complains of chest pain and shortness of breath. Patient was discharged from [facility] on Wednesday. At that time she was diagnosed with a STEMI. She had a 2 stage heart catheterization with stenting. She tolerated this well. She states she continued to have pain in her chest with inspiration. She had heaviness as well. The symptoms worsened at home. She did not take anything for the discomfort. She presented to the ED. She is feeling better since presentation. Past Medical History:    Past Medical History:   Diagnosis Date    Autoimmune disorder ([address]     [facility]     Heartburn     Hypertension     Pemphigus vulgaris        Past Surgical History:    Past Surgical History:   Procedure Laterality Date     SECTION      COLONOSCOPY      FOOT SURGERY Left     HYSTERECTOMY      partial hysterectomy    UPPER GASTROINTESTINAL ENDOSCOPY      UPPER GASTROINTESTINAL ENDOSCOPY N/A 2019    EGD BIOPSY performed by Warner Sacks, MD at [facility] ENDOSCOPY       Medications Prior to Admission:    Medications Prior to Admission: pantoprazole (PROTONIX) 40 MG tablet,   oxyCODONE-acetaminophen (PERCOCET) 7.5-325 MG per tablet, Take 1 tablet by mouth every 4 hours as needed for Pain. Bosie Sprinkle azaTHIOprine (IMURAN) 50 MG tablet, Take 50 mg by mouth daily  predniSONE (DELTASONE) 20 MG tablet, Take 10 mg by mouth daily Instructed to take am of procedure  potassium chloride (KLOR-CON) 10 MEQ CR tablet, Take 20 mEq by mouth daily. desonide (DESOWEN) 0.05 % ointment,   apixaban (ELIQUIS STARTER PACK) 5 MG TABS tablet, Take 10 mg (2 tablets) orally twice daily for 7 days, then take 5 mg (1 tablet) orally twice daily thereafter.  (Patient not taking: Reported on 7/30/2019)  amLODIPine (NORVASC) 5 MG tablet, Take 5 mg by mouth daily Instructed to take am of procedure    Allergies:    Patient has no known allergies. Social History:    reports that she has never smoked. She has never used smokeless tobacco. She reports current alcohol use. She reports that she does not use drugs. Family History:   family history is not on file. REVIEW OF SYSTEMS:  As above in the HPI, otherwise negative    PHYSICAL EXAM:    Vitals:  /69   Pulse 89   Temp 97.7 °F (36.5 °C) (Oral)   Resp 20   Ht 5' 4\" (1.626 m)   Wt 190 lb (86.2 kg)   SpO2 100%   BMI 32.61 kg/m²     General:  Awake, alert, oriented X 3. Well developed, well nourished, well groomed. No apparent distress. HEENT:  Normocephalic, atraumatic. Pupils equal, round, reactive to light. No scleral icterus. No conjunctival injection. Normal lips, teeth, and gums. No nasal discharge. Neck:  Supple  Heart:  RRR, no murmurs, gallops, rubs  Lungs:  CTA bilaterally, bilat symmetrical expansion, no wheeze, rales, or rhonchi  Abdomen:   Bowel sounds present, soft, nontender, no masses, no organomegaly, no peritoneal signs  Extremities:  No clubbing, cyanosis, or edema  Skin:  Warm and dry, no open lesions or rash  Neuro:  Cranial nerves 2-12 intact, no focal deficits  Breast: deferred  Rectal: deferred  Genitalia:  deferred    LABS:    CBC with Differential:    Lab Results   Component Value Date    WBC 12.9 01/31/2020    RBC 4.75 01/31/2020    HGB 12.8 01/31/2020    HCT 40.5 01/31/2020     01/31/2020    MCV 85.3 01/31/2020    MCH 26.9 01/31/2020    MCHC 31.6 01/31/2020    RDW 14.1 01/31/2020    SEGSPCT 81 02/05/2014    LYMPHOPCT 10.4 01/31/2020    MONOPCT 5.0 01/31/2020    BASOPCT 0.4 01/31/2020    MONOSABS 0.64 01/31/2020    LYMPHSABS 1.34 01/31/2020    EOSABS 0.02 01/31/2020    BASOSABS 0.05 01/31/2020     CMP:    Lab Results   Component Value Date     01/31/2020    K 3.9 01/31/2020

## 2020-02-01 NOTE — CONSULTS
meetings of clubs or organizations: Not on file     Relationship status: Not on file    Intimate partner violence:     Fear of current or ex partner: Not on file     Emotionally abused: Not on file     Physically abused: Not on file     Forced sexual activity: Not on file   Other Topics Concern    Not on file   Social History Narrative    Not on file       History reviewed. No pertinent family history. Review of Systems:   Heart: as above   Lungs: as above   Eyes: denies changes in vision or discharge. Ears: denies changes in hearing or pain. Nose: denies epistaxis or masses   Throat: denies sore throat or trouble swallowing. Neuro: denies numbness, tingling, tremors. Skin: denies rashes or itching. : denies hematuria, dysuria   GI: denies vomiting, diarrhea   Psych: denies mood changed, anxiety, depression. all others negative. Physical Exam   /69   Pulse 89   Temp 97.7 °F (36.5 °C) (Oral)   Resp 20   Ht 5' 4\" (1.626 m)   Wt 190 lb (86.2 kg)   SpO2 100%   BMI 32.61 kg/m²   Constitutional: Oriented to person, place, and time. Well-developed and well-nourished. No distress. Head: Normocephalic and atraumatic. Eyes: EOM are normal. Pupils are equal, round, and reactive to light. Neck: Normal range of motion. Neck supple. No hepatojugular reflux and no JVD present. Carotid bruit is not present. No tracheal deviation present. No thyromegaly present. Cardiovascular: Normal rate, regular rhythm, normal heart sounds and intact distal pulses. Exam reveals no gallop and no friction rub. No murmur heard. Pulmonary/Chest: Effort normal and breath sounds normal. No respiratory distress. No wheezes. No rales. No tenderness. Abdominal: Soft. Bowel sounds are normal. No distension and no mass. No tenderness. No rebound and no guarding. Musculoskeletal: Normal range of motion. No edema and no tenderness. Lymphadenopathy:   No cervical adenopathy.  No groin adenopathy. Neurological: Alert and oriented to person, place, and time. Skin: Skin is warm and dry. No rash noted. Not diaphoretic. No erythema. Psychiatric: Normal mood and affect. Behavior is normal.       CBC:   Lab Results   Component Value Date    WBC 12.9 01/31/2020    RBC 4.75 01/31/2020    HGB 12.8 01/31/2020    HCT 40.5 01/31/2020    MCV 85.3 01/31/2020    MCH 26.9 01/31/2020    MCHC 31.6 01/31/2020    RDW 14.1 01/31/2020     01/31/2020    MPV 9.5 01/31/2020     BMP:   Lab Results   Component Value Date     01/31/2020    K 3.9 01/31/2020     01/31/2020    CO2 20 01/31/2020    BUN 10 01/31/2020    LABALBU 4.2 02/13/2019    LABALBU 3.6 02/24/2012    CREATININE 0.8 01/31/2020    CALCIUM 9.5 01/31/2020    GFRAA >60 01/31/2020    LABGLOM >60 01/31/2020     Magnesium:  No results found for: MG  Cardiac Enzymes:   Lab Results   Component Value Date    CKTOTAL 101 02/05/2014    CKTOTAL 69 02/24/2012    CKMB 2.8 02/05/2014    CKMB 0.8 02/24/2012    TROPONINI 0.46 (H) 01/31/2020    TROPONINI 0.52 (H) 01/31/2020    TROPONINI <0.01 02/05/2014      PT/INR:    Lab Results   Component Value Date    PROTIME 12.4 01/31/2020    INR 1.0 01/31/2020     TSH:  No results found for: TSH    Rhythm Strip: normal sinus rhythm. EKG:  normal sinus rhythm, nonspecific ST and T waves changes, left axis deviation, inferior Q waves. ASSESSMENT AND PLAN:  Patient Active Problem List   Diagnosis    Chest pain    Obesity (BMI 30-39. 9)    CAD (coronary artery disease), native coronary artery    Hyperlipidemia LDL goal <100     1. Chest pain/CAD: Recent MI.    EKG/CTA/ESR okay. Troponin mildly elevated as expected. Request records. Echo. ASA/statin/BB/Brilinta. 2. Lipids: Statin. 3. HTN: Observe. 4. Hx of DVT    Delmus Si, D.O.   Cardiologist  Cardiology, [address]

## 2020-02-02 VITALS
HEART RATE: 92 BPM | TEMPERATURE: 97.6 F | HEIGHT: 64 IN | RESPIRATION RATE: 18 BRPM | SYSTOLIC BLOOD PRESSURE: 128 MMHG | BODY MASS INDEX: 32.44 KG/M2 | OXYGEN SATURATION: 97 % | DIASTOLIC BLOOD PRESSURE: 77 MMHG | WEIGHT: 190 LBS

## 2020-02-02 PROCEDURE — 99215 OFFICE O/P EST HI 40 MIN: CPT | Performed by: INTERNAL MEDICINE

## 2020-02-02 PROCEDURE — 6370000000 HC RX 637 (ALT 250 FOR IP): Performed by: PHYSICIAN ASSISTANT

## 2020-02-02 PROCEDURE — 2580000003 HC RX 258: Performed by: PHYSICIAN ASSISTANT

## 2020-02-02 PROCEDURE — G0378 HOSPITAL OBSERVATION PER HR: HCPCS

## 2020-02-02 PROCEDURE — 6360000002 HC RX W HCPCS: Performed by: PHYSICIAN ASSISTANT

## 2020-02-02 RX ORDER — ASPIRIN 81 MG/1
81 TABLET, CHEWABLE ORAL DAILY
Qty: 30 TABLET | Refills: 0 | Status: SHIPPED | OUTPATIENT
Start: 2020-02-03

## 2020-02-02 RX ORDER — METOPROLOL SUCCINATE 25 MG/1
25 TABLET, EXTENDED RELEASE ORAL DAILY
Qty: 30 TABLET | Refills: 0 | Status: SHIPPED | OUTPATIENT
Start: 2020-02-03 | End: 2020-03-09 | Stop reason: SDUPTHER

## 2020-02-02 RX ORDER — ATORVASTATIN CALCIUM 80 MG/1
80 TABLET, FILM COATED ORAL NIGHTLY
Qty: 30 TABLET | Refills: 0 | Status: SHIPPED | OUTPATIENT
Start: 2020-02-02 | End: 2020-03-09 | Stop reason: SDUPTHER

## 2020-02-02 RX ADMIN — PREDNISONE 10 MG: 5 TABLET ORAL at 08:20

## 2020-02-02 RX ADMIN — ASPIRIN 81 MG 81 MG: 81 TABLET ORAL at 08:19

## 2020-02-02 RX ADMIN — TICAGRELOR 90 MG: 90 TABLET ORAL at 08:19

## 2020-02-02 RX ADMIN — Medication 10 ML: at 08:20

## 2020-02-02 RX ADMIN — OXYCODONE HYDROCHLORIDE AND ACETAMINOPHEN 1.5 TABLET: 5; 325 TABLET ORAL at 00:28

## 2020-02-02 RX ADMIN — METOPROLOL SUCCINATE 25 MG: 25 TABLET, FILM COATED, EXTENDED RELEASE ORAL at 08:19

## 2020-02-02 RX ADMIN — OXYCODONE HYDROCHLORIDE AND ACETAMINOPHEN 1.5 TABLET: 5; 325 TABLET ORAL at 08:19

## 2020-02-02 RX ADMIN — PANTOPRAZOLE SODIUM 40 MG: 40 TABLET, DELAYED RELEASE ORAL at 05:44

## 2020-02-02 RX ADMIN — AZATHIOPRINE 50 MG: 50 TABLET ORAL at 08:26

## 2020-02-02 ASSESSMENT — PAIN DESCRIPTION - FREQUENCY
FREQUENCY: INTERMITTENT
FREQUENCY: INTERMITTENT

## 2020-02-02 ASSESSMENT — PAIN DESCRIPTION - PROGRESSION
CLINICAL_PROGRESSION: GRADUALLY WORSENING
CLINICAL_PROGRESSION: GRADUALLY WORSENING

## 2020-02-02 ASSESSMENT — PAIN DESCRIPTION - DESCRIPTORS
DESCRIPTORS: HEAVINESS
DESCRIPTORS: HEAVINESS

## 2020-02-02 ASSESSMENT — PAIN DESCRIPTION - ONSET
ONSET: ON-GOING
ONSET: ON-GOING

## 2020-02-02 ASSESSMENT — PAIN - FUNCTIONAL ASSESSMENT
PAIN_FUNCTIONAL_ASSESSMENT: ACTIVITIES ARE NOT PREVENTED
PAIN_FUNCTIONAL_ASSESSMENT: ACTIVITIES ARE NOT PREVENTED

## 2020-02-02 ASSESSMENT — PAIN DESCRIPTION - PAIN TYPE
TYPE: ACUTE PAIN
TYPE: ACUTE PAIN

## 2020-02-02 ASSESSMENT — PAIN DESCRIPTION - LOCATION
LOCATION: CHEST
LOCATION: CHEST

## 2020-02-02 ASSESSMENT — PAIN SCALES - GENERAL
PAINLEVEL_OUTOF10: 9
PAINLEVEL_OUTOF10: 9
PAINLEVEL_OUTOF10: 8

## 2020-02-02 ASSESSMENT — PAIN DESCRIPTION - ORIENTATION
ORIENTATION: MID
ORIENTATION: MID

## 2020-02-02 NOTE — DISCHARGE SUMMARY
Physician Discharge Summary     Patient ID:  Ayaka Anaya  [id number]  47 y.o.  1965    Admit date: 1/31/2020    Discharge date and time:  2/2/2020    Admission Diagnoses:   Patient Active Problem List   Diagnosis    Chest pain    Obesity (BMI 30-39. 9)    CAD (coronary artery disease), native coronary artery    Hyperlipidemia LDL goal <100       Discharge Diagnoses: as above    Consults: cardiology    Procedures: see chart    Hospital Course: patient was admitted with chest pain. She had recently suffered a STEMI and underwent PCI with MICHELLE placement. She was seen by cardiology. Her anti-anginal medications were adjusted. She did well. Discharge Exam:  See progress note from today    Condition:  stable    Disposition: home    Patient Instructions:   Current Discharge Medication List      START taking these medications    Details   aspirin 81 MG chewable tablet Take 1 tablet by mouth daily  Qty: 30 tablet, Refills: 0      atorvastatin (LIPITOR) 80 MG tablet Take 1 tablet by mouth nightly  Qty: 30 tablet, Refills: 0      metoprolol succinate (TOPROL XL) 25 MG extended release tablet Take 1 tablet by mouth daily  Qty: 30 tablet, Refills: 0      ticagrelor (BRILINTA) 90 MG TABS tablet Take 1 tablet by mouth 2 times daily  Qty: 60 tablet, Refills: 0         CONTINUE these medications which have NOT CHANGED    Details   pantoprazole (PROTONIX) 40 MG tablet Refills: 0      oxyCODONE-acetaminophen (PERCOCET) 7.5-325 MG per tablet Take 1 tablet by mouth every 4 hours as needed for Pain. Gabi Esposito azaTHIOprine (IMURAN) 50 MG tablet Take 50 mg by mouth daily      predniSONE (DELTASONE) 20 MG tablet Take 10 mg by mouth daily Instructed to take am of procedure      potassium chloride (KLOR-CON) 10 MEQ CR tablet Take 20 mEq by mouth daily.         desonide (DESOWEN) 0.05 % ointment Refills: 0         STOP taking these medications       apixaban (ELIQUIS STARTER PACK) 5 MG TABS tablet Comments:   Reason for Stopping:

## 2020-02-02 NOTE — PROGRESS NOTES
Dr. Jacquie Louise notified regarding low potassium and elevated troponin.  Awaiting orders
[facility] Quality Flow/Interdisciplinary Rounds Progress Note        Quality Flow Rounds held on February 1, 2020    Disciplines Attending:  Bedside Nurse, ,  and Nursing Unit [address] was admitted on 1/31/2020  4:09 PM    Anticipated Discharge Date:  Expected Discharge Date: 02/03/20    Disposition:    Kiran Score:  Kiran Scale Score: 21    Readmission Risk              Risk of Unplanned Readmission:        9           Discussed patient goal for the day, patient clinical progression, and barriers to discharge.   The following Goal(s) of the Day/Commitment(s) have been identified:  Other  Check Cardio plan       Niko Collazo  February 1, 2020
Occupational Therapy  Date:2/1/2020  Patient Name: Cassia Beavers  Room: 0603/0603-A     Occupational Therapy (OT) order received and OT evaluation attempted this date. Patient reported that she is independent with ADLs and functional transfers/mobility and denied having concerns about returning home upon discharge; patient noted that friends/family can assist with IADLs, as needed. No OT evaluation completed, per patient preference. Nory Roa, OTR/L  License Number: [id number]
Potassium protocol will be used for low potassium of 3.4
Results   Component Value Date    MG 2.0 02/01/2020     Phosphorus:  No results found for: PHOS  PT/INR:    Lab Results   Component Value Date    PROTIME 12.4 01/31/2020    INR 1.0 01/31/2020     PTT:    Lab Results   Component Value Date    APTT 29.5 01/31/2020   [APTT}     Assessment:    Patient Active Problem List   Diagnosis    Chest pain    Obesity (BMI 30-39. 9)    CAD (coronary artery disease), native coronary artery    Hyperlipidemia LDL goal <100       Plan:    Rule out ACS. Await cardiology recommendations. Continue to encourage weight loss. Continue medical management of ASCAD. Continue aggressive lipid therapy  Discharge once ok with cardiology.     Angelika Vargas    10:23 AM  2/2/2020

## 2020-02-15 ENCOUNTER — APPOINTMENT (OUTPATIENT)
Dept: GENERAL RADIOLOGY | Age: 55
End: 2020-02-15
Payer: COMMERCIAL

## 2020-02-15 ENCOUNTER — APPOINTMENT (OUTPATIENT)
Dept: CT IMAGING | Age: 55
End: 2020-02-15
Payer: COMMERCIAL

## 2020-02-15 ENCOUNTER — HOSPITAL ENCOUNTER (OUTPATIENT)
Age: 55
Setting detail: OBSERVATION
Discharge: HOME | End: 2020-02-16
Attending: EMERGENCY MEDICINE | Admitting: INTERNAL MEDICINE
Payer: COMMERCIAL

## 2020-02-15 PROBLEM — R07.9 CHEST PAIN: Status: RESOLVED | Noted: 2020-01-31 | Resolved: 2020-02-15

## 2020-02-15 PROBLEM — R06.02 SHORTNESS OF BREATH: Status: RESOLVED | Noted: 2020-02-15 | Resolved: 2020-02-15

## 2020-02-15 PROBLEM — R06.00 ACUTE DYSPNEA: Status: ACTIVE | Noted: 2020-02-15

## 2020-02-15 PROBLEM — R06.00 ACUTE DYSPNEA: Status: RESOLVED | Noted: 2020-02-15 | Resolved: 2020-02-15

## 2020-02-15 PROBLEM — R06.02 SHORTNESS OF BREATH: Status: ACTIVE | Noted: 2020-02-15

## 2020-02-15 PROBLEM — R07.9 CHEST PAIN: Status: ACTIVE | Noted: 2020-02-15

## 2020-02-15 LAB
ALBUMIN SERPL-MCNC: 4.1 G/DL (ref 3.5–5.2)
ALP BLD-CCNC: 73 U/L (ref 35–104)
ALT SERPL-CCNC: 21 U/L (ref 0–32)
ANION GAP SERPL CALCULATED.3IONS-SCNC: 14 MMOL/L (ref 7–16)
APTT: 28 SEC (ref 24.5–35.1)
AST SERPL-CCNC: 21 U/L (ref 0–31)
BASOPHILS ABSOLUTE: 0.04 E9/L (ref 0–0.2)
BASOPHILS RELATIVE PERCENT: 0.4 % (ref 0–2)
BILIRUB SERPL-MCNC: 0.4 MG/DL (ref 0–1.2)
BUN BLDV-MCNC: 11 MG/DL (ref 6–20)
CALCIUM SERPL-MCNC: 9.4 MG/DL (ref 8.6–10.2)
CHLORIDE BLD-SCNC: 103 MMOL/L (ref 98–107)
CO2: 23 MMOL/L (ref 22–29)
CREAT SERPL-MCNC: 0.7 MG/DL (ref 0.5–1)
EKG ATRIAL RATE: 85 BPM
EKG P AXIS: 34 DEGREES
EKG P-R INTERVAL: 146 MS
EKG Q-T INTERVAL: 378 MS
EKG QRS DURATION: 82 MS
EKG QTC CALCULATION (BAZETT): 449 MS
EKG R AXIS: -49 DEGREES
EKG T AXIS: 4 DEGREES
EKG VENTRICULAR RATE: 85 BPM
EOSINOPHILS ABSOLUTE: 0.05 E9/L (ref 0.05–0.5)
EOSINOPHILS RELATIVE PERCENT: 0.4 % (ref 0–6)
GFR AFRICAN AMERICAN: >60
GFR NON-AFRICAN AMERICAN: >60 ML/MIN/1.73
GLUCOSE BLD-MCNC: 115 MG/DL (ref 74–99)
HCT VFR BLD CALC: 41.5 % (ref 34–48)
HEMOGLOBIN: 12.9 G/DL (ref 11.5–15.5)
IMMATURE GRANULOCYTES #: 0.04 E9/L
IMMATURE GRANULOCYTES %: 0.4 % (ref 0–5)
INR BLD: 0.9
LIPASE: 22 U/L (ref 13–60)
LYMPHOCYTES ABSOLUTE: 3.68 E9/L (ref 1.5–4)
LYMPHOCYTES RELATIVE PERCENT: 32.3 % (ref 20–42)
MCH RBC QN AUTO: 26.8 PG (ref 26–35)
MCHC RBC AUTO-ENTMCNC: 31.1 % (ref 32–34.5)
MCV RBC AUTO: 86.1 FL (ref 80–99.9)
MONOCYTES ABSOLUTE: 1.06 E9/L (ref 0.1–0.95)
MONOCYTES RELATIVE PERCENT: 9.3 % (ref 2–12)
NEUTROPHILS ABSOLUTE: 6.54 E9/L (ref 1.8–7.3)
NEUTROPHILS RELATIVE PERCENT: 57.2 % (ref 43–80)
PDW BLD-RTO: 14.5 FL (ref 11.5–15)
PLATELET # BLD: 281 E9/L (ref 130–450)
PMV BLD AUTO: 9.9 FL (ref 7–12)
POTASSIUM REFLEX MAGNESIUM: 3.8 MMOL/L (ref 3.5–5)
PRO-BNP: 321 PG/ML (ref 0–125)
PROTHROMBIN TIME: 11.2 SEC (ref 9.3–12.4)
RBC # BLD: 4.82 E12/L (ref 3.5–5.5)
SODIUM BLD-SCNC: 140 MMOL/L (ref 132–146)
TOTAL PROTEIN: 7.3 G/DL (ref 6.4–8.3)
TROPONIN: <0.01 NG/ML (ref 0–0.03)
WBC # BLD: 11.4 E9/L (ref 4.5–11.5)

## 2020-02-15 PROCEDURE — 94760 N-INVAS EAR/PLS OXIMETRY 1: CPT

## 2020-02-15 PROCEDURE — 85610 PROTHROMBIN TIME: CPT

## 2020-02-15 PROCEDURE — 99285 EMERGENCY DEPT VISIT HI MDM: CPT

## 2020-02-15 PROCEDURE — 71275 CT ANGIOGRAPHY CHEST: CPT

## 2020-02-15 PROCEDURE — 96375 TX/PRO/DX INJ NEW DRUG ADDON: CPT

## 2020-02-15 PROCEDURE — 85730 THROMBOPLASTIN TIME PARTIAL: CPT

## 2020-02-15 PROCEDURE — 83880 ASSAY OF NATRIURETIC PEPTIDE: CPT

## 2020-02-15 PROCEDURE — 71045 X-RAY EXAM CHEST 1 VIEW: CPT

## 2020-02-15 PROCEDURE — 6360000002 HC RX W HCPCS: Performed by: NURSE PRACTITIONER

## 2020-02-15 PROCEDURE — G0378 HOSPITAL OBSERVATION PER HR: HCPCS

## 2020-02-15 PROCEDURE — 6370000000 HC RX 637 (ALT 250 FOR IP): Performed by: INTERNAL MEDICINE

## 2020-02-15 PROCEDURE — 96372 THER/PROPH/DIAG INJ SC/IM: CPT

## 2020-02-15 PROCEDURE — 97161 PT EVAL LOW COMPLEX 20 MIN: CPT

## 2020-02-15 PROCEDURE — 85025 COMPLETE CBC W/AUTO DIFF WBC: CPT

## 2020-02-15 PROCEDURE — 36415 COLL VENOUS BLD VENIPUNCTURE: CPT

## 2020-02-15 PROCEDURE — 96374 THER/PROPH/DIAG INJ IV PUSH: CPT

## 2020-02-15 PROCEDURE — 6360000002 HC RX W HCPCS: Performed by: EMERGENCY MEDICINE

## 2020-02-15 PROCEDURE — 80053 COMPREHEN METABOLIC PANEL: CPT

## 2020-02-15 PROCEDURE — 6370000000 HC RX 637 (ALT 250 FOR IP): Performed by: NURSE PRACTITIONER

## 2020-02-15 PROCEDURE — 93010 ELECTROCARDIOGRAM REPORT: CPT | Performed by: INTERNAL MEDICINE

## 2020-02-15 PROCEDURE — 2580000003 HC RX 258: Performed by: RADIOLOGY

## 2020-02-15 PROCEDURE — 99215 OFFICE O/P EST HI 40 MIN: CPT | Performed by: INTERNAL MEDICINE

## 2020-02-15 PROCEDURE — 2580000003 HC RX 258: Performed by: NURSE PRACTITIONER

## 2020-02-15 PROCEDURE — 83690 ASSAY OF LIPASE: CPT

## 2020-02-15 PROCEDURE — 84484 ASSAY OF TROPONIN QUANT: CPT

## 2020-02-15 PROCEDURE — 6360000004 HC RX CONTRAST MEDICATION: Performed by: RADIOLOGY

## 2020-02-15 PROCEDURE — 93005 ELECTROCARDIOGRAM TRACING: CPT | Performed by: EMERGENCY MEDICINE

## 2020-02-15 RX ORDER — PANTOPRAZOLE SODIUM 40 MG/1
40 TABLET, DELAYED RELEASE ORAL
Status: DISCONTINUED | OUTPATIENT
Start: 2020-02-15 | End: 2020-02-16 | Stop reason: HOSPADM

## 2020-02-15 RX ORDER — PREDNISONE 1 MG/1
10 TABLET ORAL 2 TIMES DAILY
Status: DISCONTINUED | OUTPATIENT
Start: 2020-02-15 | End: 2020-02-16 | Stop reason: HOSPADM

## 2020-02-15 RX ORDER — PRASUGREL 10 MG/1
10 TABLET, FILM COATED ORAL DAILY
Status: DISCONTINUED | OUTPATIENT
Start: 2020-02-16 | End: 2020-02-16 | Stop reason: HOSPADM

## 2020-02-15 RX ORDER — PREDNISONE 1 MG/1
10 TABLET ORAL DAILY
Status: DISCONTINUED | OUTPATIENT
Start: 2020-02-15 | End: 2020-02-15

## 2020-02-15 RX ORDER — AZATHIOPRINE 50 MG/1
50 TABLET ORAL DAILY
Status: DISCONTINUED | OUTPATIENT
Start: 2020-02-15 | End: 2020-02-16 | Stop reason: HOSPADM

## 2020-02-15 RX ORDER — SODIUM CHLORIDE 0.9 % (FLUSH) 0.9 %
10 SYRINGE (ML) INJECTION PRN
Status: DISCONTINUED | OUTPATIENT
Start: 2020-02-15 | End: 2020-02-16 | Stop reason: HOSPADM

## 2020-02-15 RX ORDER — METOPROLOL SUCCINATE 25 MG/1
25 TABLET, EXTENDED RELEASE ORAL DAILY
Status: DISCONTINUED | OUTPATIENT
Start: 2020-02-15 | End: 2020-02-16 | Stop reason: HOSPADM

## 2020-02-15 RX ORDER — OXYCODONE HYDROCHLORIDE AND ACETAMINOPHEN 5; 325 MG/1; MG/1
1 TABLET ORAL EVERY 4 HOURS PRN
Status: DISCONTINUED | OUTPATIENT
Start: 2020-02-15 | End: 2020-02-16 | Stop reason: HOSPADM

## 2020-02-15 RX ORDER — ACETAMINOPHEN 325 MG/1
650 TABLET ORAL EVERY 4 HOURS PRN
Status: DISCONTINUED | OUTPATIENT
Start: 2020-02-15 | End: 2020-02-16 | Stop reason: HOSPADM

## 2020-02-15 RX ORDER — POTASSIUM CHLORIDE 20 MEQ/1
20 TABLET, EXTENDED RELEASE ORAL DAILY
Status: DISCONTINUED | OUTPATIENT
Start: 2020-02-15 | End: 2020-02-15

## 2020-02-15 RX ORDER — PRASUGREL 10 MG/1
60 TABLET, FILM COATED ORAL ONCE
Status: COMPLETED | OUTPATIENT
Start: 2020-02-15 | End: 2020-02-15

## 2020-02-15 RX ORDER — ATORVASTATIN CALCIUM 40 MG/1
80 TABLET, FILM COATED ORAL NIGHTLY
Status: DISCONTINUED | OUTPATIENT
Start: 2020-02-15 | End: 2020-02-16 | Stop reason: HOSPADM

## 2020-02-15 RX ORDER — OXYCODONE AND ACETAMINOPHEN 7.5; 325 MG/1; MG/1
1 TABLET ORAL EVERY 4 HOURS PRN
Status: DISCONTINUED | OUTPATIENT
Start: 2020-02-15 | End: 2020-02-15 | Stop reason: CLARIF

## 2020-02-15 RX ORDER — ASPIRIN 81 MG/1
81 TABLET, CHEWABLE ORAL DAILY
Status: DISCONTINUED | OUTPATIENT
Start: 2020-02-15 | End: 2020-02-16 | Stop reason: HOSPADM

## 2020-02-15 RX ORDER — SODIUM CHLORIDE 0.9 % (FLUSH) 0.9 %
10 SYRINGE (ML) INJECTION 2 TIMES DAILY
Status: DISCONTINUED | OUTPATIENT
Start: 2020-02-15 | End: 2020-02-15 | Stop reason: SDUPTHER

## 2020-02-15 RX ORDER — POTASSIUM CHLORIDE 20 MEQ/1
20 TABLET, EXTENDED RELEASE ORAL 2 TIMES DAILY WITH MEALS
Status: DISCONTINUED | OUTPATIENT
Start: 2020-02-15 | End: 2020-02-16 | Stop reason: HOSPADM

## 2020-02-15 RX ORDER — OXYCODONE HYDROCHLORIDE 5 MG/1
2.5 TABLET ORAL EVERY 4 HOURS PRN
Status: DISCONTINUED | OUTPATIENT
Start: 2020-02-15 | End: 2020-02-16 | Stop reason: HOSPADM

## 2020-02-15 RX ORDER — DIPHENHYDRAMINE HYDROCHLORIDE 50 MG/ML
25 INJECTION INTRAMUSCULAR; INTRAVENOUS ONCE
Status: COMPLETED | OUTPATIENT
Start: 2020-02-15 | End: 2020-02-15

## 2020-02-15 RX ORDER — SODIUM CHLORIDE 0.9 % (FLUSH) 0.9 %
10 SYRINGE (ML) INJECTION EVERY 12 HOURS SCHEDULED
Status: DISCONTINUED | OUTPATIENT
Start: 2020-02-15 | End: 2020-02-16 | Stop reason: HOSPADM

## 2020-02-15 RX ORDER — METHYLPREDNISOLONE SODIUM SUCCINATE 125 MG/2ML
125 INJECTION, POWDER, LYOPHILIZED, FOR SOLUTION INTRAMUSCULAR; INTRAVENOUS ONCE
Status: COMPLETED | OUTPATIENT
Start: 2020-02-15 | End: 2020-02-15

## 2020-02-15 RX ORDER — ONDANSETRON 2 MG/ML
4 INJECTION INTRAMUSCULAR; INTRAVENOUS EVERY 6 HOURS PRN
Status: DISCONTINUED | OUTPATIENT
Start: 2020-02-15 | End: 2020-02-16 | Stop reason: HOSPADM

## 2020-02-15 RX ADMIN — PRASUGREL 60 MG: 10 TABLET, FILM COATED ORAL at 10:37

## 2020-02-15 RX ADMIN — METHYLPREDNISOLONE SODIUM SUCCINATE 125 MG: 125 INJECTION, POWDER, FOR SOLUTION INTRAMUSCULAR; INTRAVENOUS at 04:18

## 2020-02-15 RX ADMIN — PANTOPRAZOLE SODIUM 40 MG: 40 TABLET, DELAYED RELEASE ORAL at 10:33

## 2020-02-15 RX ADMIN — IOPAMIDOL 80 ML: 755 INJECTION, SOLUTION INTRAVENOUS at 05:02

## 2020-02-15 RX ADMIN — PREDNISONE 10 MG: 5 TABLET ORAL at 20:10

## 2020-02-15 RX ADMIN — ASPIRIN 81 MG 81 MG: 81 TABLET ORAL at 10:33

## 2020-02-15 RX ADMIN — ATORVASTATIN CALCIUM 80 MG: 40 TABLET, FILM COATED ORAL at 20:10

## 2020-02-15 RX ADMIN — OXYCODONE HYDROCHLORIDE 2.5 MG: 5 TABLET ORAL at 20:10

## 2020-02-15 RX ADMIN — SODIUM CHLORIDE, PRESERVATIVE FREE 10 ML: 5 INJECTION INTRAVENOUS at 10:34

## 2020-02-15 RX ADMIN — POTASSIUM CHLORIDE 20 MEQ: 20 TABLET, EXTENDED RELEASE ORAL at 16:43

## 2020-02-15 RX ADMIN — POTASSIUM CHLORIDE 20 MEQ: 20 TABLET, EXTENDED RELEASE ORAL at 10:33

## 2020-02-15 RX ADMIN — SODIUM CHLORIDE, PRESERVATIVE FREE 10 ML: 5 INJECTION INTRAVENOUS at 20:11

## 2020-02-15 RX ADMIN — DIPHENHYDRAMINE HYDROCHLORIDE 25 MG: 50 INJECTION, SOLUTION INTRAMUSCULAR; INTRAVENOUS at 04:18

## 2020-02-15 RX ADMIN — OXYCODONE HYDROCHLORIDE AND ACETAMINOPHEN 1 TABLET: 5; 325 TABLET ORAL at 20:10

## 2020-02-15 RX ADMIN — PREDNISONE 10 MG: 5 TABLET ORAL at 10:33

## 2020-02-15 RX ADMIN — METOPROLOL SUCCINATE 25 MG: 25 TABLET, EXTENDED RELEASE ORAL at 10:33

## 2020-02-15 RX ADMIN — ENOXAPARIN SODIUM 40 MG: 40 INJECTION SUBCUTANEOUS at 10:33

## 2020-02-15 RX ADMIN — AZATHIOPRINE 50 MG: 50 TABLET ORAL at 10:37

## 2020-02-15 RX ADMIN — ACETAMINOPHEN 650 MG: 325 TABLET ORAL at 10:33

## 2020-02-15 RX ADMIN — LIDOCAINE HYDROCHLORIDE: 20 SOLUTION ORAL; TOPICAL at 10:37

## 2020-02-15 RX ADMIN — Medication 10 ML: at 04:57

## 2020-02-15 ASSESSMENT — ENCOUNTER SYMPTOMS
ALLERGIC/IMMUNOLOGIC NEGATIVE: 1
SWOLLEN GLANDS: 0
ABDOMINAL PAIN: 0
COUGH: 0
SORE THROAT: 0
VOMITING: 1
HEMOPTYSIS: 0
WHEEZING: 0
EYES NEGATIVE: 1
SPUTUM PRODUCTION: 0
SHORTNESS OF BREATH: 1

## 2020-02-15 ASSESSMENT — PAIN SCALES - GENERAL
PAINLEVEL_OUTOF10: 8
PAINLEVEL_OUTOF10: 0
PAINLEVEL_OUTOF10: 8
PAINLEVEL_OUTOF10: 0
PAINLEVEL_OUTOF10: 8
PAINLEVEL_OUTOF10: 3

## 2020-02-15 ASSESSMENT — PAIN - FUNCTIONAL ASSESSMENT: PAIN_FUNCTIONAL_ASSESSMENT: ACTIVITIES ARE NOT PREVENTED

## 2020-02-15 ASSESSMENT — PAIN DESCRIPTION - PAIN TYPE
TYPE: ACUTE PAIN

## 2020-02-15 ASSESSMENT — PAIN DESCRIPTION - FREQUENCY
FREQUENCY: CONTINUOUS
FREQUENCY: CONTINUOUS

## 2020-02-15 ASSESSMENT — PAIN DESCRIPTION - DESCRIPTORS
DESCRIPTORS: HEAVINESS
DESCRIPTORS: ACHING;CONSTANT;DISCOMFORT
DESCRIPTORS: ACHING;DISCOMFORT

## 2020-02-15 ASSESSMENT — PAIN DESCRIPTION - LOCATION
LOCATION: CHEST

## 2020-02-15 ASSESSMENT — PAIN DESCRIPTION - PROGRESSION
CLINICAL_PROGRESSION: GRADUALLY WORSENING
CLINICAL_PROGRESSION: GRADUALLY WORSENING

## 2020-02-15 ASSESSMENT — PAIN DESCRIPTION - ORIENTATION
ORIENTATION: MID
ORIENTATION: MID

## 2020-02-15 ASSESSMENT — PAIN DESCRIPTION - ONSET
ONSET: ON-GOING
ONSET: ON-GOING

## 2020-02-15 NOTE — ED PROVIDER NOTES
chest pain, claudication, syncope and PND. Gastrointestinal: Positive for vomiting. Negative for abdominal pain. Endocrine: Negative. Genitourinary: Negative. Musculoskeletal: Negative for neck pain. Skin: Positive for rash. Allergic/Immunologic: Negative. Neurological: Negative. Negative for headaches. All other systems reviewed and are negative. Physical Exam  Vitals signs and nursing note reviewed. Constitutional:       General: She is in acute distress. Appearance: Normal appearance. She is not ill-appearing, toxic-appearing or diaphoretic. HENT:      Head: Normocephalic and atraumatic. Nose: Nose normal. No congestion or rhinorrhea. Eyes:      General: No scleral icterus. Right eye: No discharge. Left eye: No discharge. Extraocular Movements: Extraocular movements intact. Conjunctiva/sclera: Conjunctivae normal.   Neck:      Musculoskeletal: Normal range of motion and neck supple. No neck rigidity or muscular tenderness. Cardiovascular:      Rate and Rhythm: Regular rhythm. Tachycardia present. Pulses: Normal pulses. Heart sounds: Normal heart sounds. Pulmonary:      Effort: Pulmonary effort is normal. No respiratory distress. Breath sounds: No stridor. No wheezing, rhonchi or rales. Chest:      Chest wall: No tenderness. Abdominal:      General: Abdomen is flat. Bowel sounds are normal. There is no distension. Palpations: Abdomen is soft. Tenderness: There is no abdominal tenderness. There is no guarding or rebound. Musculoskeletal: Normal range of motion. General: No swelling, tenderness, deformity or signs of injury. Right lower leg: No edema. Left lower leg: No edema. Skin:     General: Skin is warm and dry. Coloration: Skin is not jaundiced or pale. Findings: No bruising, erythema, lesion or rash. Neurological:      General: No focal deficit present.       Mental Status: She is alert and oriented to person, place, and time. Cranial Nerves: No cranial nerve deficit. Sensory: No sensory deficit. Motor: No weakness. Coordination: Coordination normal.          Procedures     MDM  Number of Diagnoses or Management Options  Acute dyspnea: new and requires workup  History of coronary artery disease: new and requires workup  Status post coronary artery stent placement: new and requires workup  Diagnosis management comments: Differential diagnoses include shortness of breath, pneumonia, bronchitis, CHF, PE, coronary artery disease, anxiety, dehydration. Amount and/or Complexity of Data Reviewed  Clinical lab tests: ordered and reviewed  Tests in the radiology section of CPT®: ordered and reviewed  Tests in the medicine section of CPT®: ordered and reviewed  Discuss the patient with other providers: yes (Case discussed with /PA. Will admit the patient.)  Independent visualization of images, tracings, or specimens: yes (EKG was done at 2:18 PM.  Normal sinus rhythm. Rate of 85. Left axis deviation. No STEMI. No acute ST-T elevation. No acute changes when it was compared to the previous EKG. )    Risk of Complications, Morbidity, and/or Mortality  Presenting problems: high  Diagnostic procedures: high  Management options: high  General comments: Patient remained stable throughout the course of treatment. Labs are unremarkable. Troponin was negative. Patient is on aspirin and Brilinta at home. The acute dyspnea that patient has developed could be signs and symptoms of angina equivalent. Decision was made to admit the patient's. Case discussed with admitting physician. Will admit the patient's. Case also discussed with patient.   She understood and agreed with her management.                  --------------------------------------------- PAST HISTORY ---------------------------------------------  Past Medical History:  has a past medical history of [facility] disorder ([facility] CAD (coronary artery disease), DVT (deep venous thrombosis) ([facility] Heartburn, Hypertension, MI (myocardial infarction) ([facility] and Pemphigus vulgaris. Past Surgical History:  has a past surgical history that includes  section; Hysterectomy; Foot surgery (Left); Colonoscopy; Upper gastrointestinal endoscopy; and Upper gastrointestinal endoscopy (N/A, 2019). Social History:  reports that she has never smoked. She has never used smokeless tobacco. She reports current alcohol use of about 1.0 standard drinks of alcohol per week. She reports that she does not use drugs. Family History: family history is not on file. The patients home medications have been reviewed. Allergies: Patient has no known allergies.     -------------------------------------------------- RESULTS -------------------------------------------------    LABS:  Results for orders placed or performed during the hospital encounter of 02/15/20   CBC Auto Differential   Result Value Ref Range    WBC 11.4 4.5 - 11.5 E9/L    RBC 4.82 3.50 - 5.50 E12/L    Hemoglobin 12.9 11.5 - 15.5 g/dL    Hematocrit 41.5 34.0 - 48.0 %    MCV 86.1 80.0 - 99.9 fL    MCH 26.8 26.0 - 35.0 pg    MCHC 31.1 (L) 32.0 - 34.5 %    RDW 14.5 11.5 - 15.0 fL    Platelets 146 972 - 695 E9/L    MPV 9.9 7.0 - 12.0 fL    Neutrophils % 57.2 43.0 - 80.0 %    Immature Granulocytes % 0.4 0.0 - 5.0 %    Lymphocytes % 32.3 20.0 - 42.0 %    Monocytes % 9.3 2.0 - 12.0 %    Eosinophils % 0.4 0.0 - 6.0 %    Basophils % 0.4 0.0 - 2.0 %    Neutrophils Absolute 6.54 1.80 - 7.30 E9/L    Immature Granulocytes # 0.04 E9/L    Lymphocytes Absolute 3.68 1.50 - 4.00 E9/L    Monocytes Absolute 1.06 (H) 0.10 - 0.95 E9/L    Eosinophils Absolute 0.05 0.05 - 0.50 E9/L    Basophils Absolute 0.04 0.00 - 0.20 E9/L   Comprehensive Metabolic Panel w/ Reflex to MG   Result Value Ref Range    Sodium 140 132 - 146 mmol/L    Potassium reflex Magnesium 3.8 3.5 - 5.0 mmol/L

## 2020-02-15 NOTE — CONSULTS
INPATIENT CARDIOLOGY CONSULT    Name: Manoj Niño    Age: 47 y.o. Date of Admission: 2/15/2020  2:08 AM    Date of Service: 2/15/2020    Reason for Consultation: CAD, history of STEMI, SOB    Referring Physician: Gildardo Davis MD    History of Present Illness:  Manoj Niño is a 47 y.o. female (recently evaluated by Dr. Chip Spain as an inpatient) who presented to [facility] on 2/15/2020 for further evaluation of progressive SOB. Her PMH is outlined in detail below (see A/P below), and includes inferior STEMI s/p staged PCI in 2020 [place], [place] where patient works; see cath results below). She denies recent exertional chest pain, palpitations, or syncope. No history of PND or orthopnea. +\"profound\" shortness of breath at rest and with exertion since Brilinta started last month (no improvement over the past ~ 3 weeks). She is currently with no active cardiac complaints at rest. SR on EKG and telemetry. Review of Systems:   Cardiac: As per HPI  General: No fever, chills  Pulmonary: As per HPI  HEENT: No visual disturbances, difficult swallowing  GI: No nausea, vomiting  Endocrine: No thyroid disease or DM  Musculoskeletal: GIRON x 4, no focal motor deficits  Skin: Intact, no rashes  Neuro/Psych: No headache or seizures    Past Medical History:  Past Medical History:   Diagnosis Date    [facility] ([facility]     CAD (coronary artery disease)     DVT (deep venous thrombosis) ([facility] 2019    right leg    Heartburn     Hypertension     MI (myocardial infarction) ([facility]     2020    Pemphigus vulgaris        Past Surgical History:  Past Surgical History:   Procedure Laterality Date     SECTION      COLONOSCOPY      FOOT SURGERY Left     HYSTERECTOMY      partial hysterectomy    UPPER GASTROINTESTINAL ENDOSCOPY      UPPER GASTROINTESTINAL ENDOSCOPY N/A 2019    EGD BIOPSY performed by Nicho Rowell MD at [facility] END[facility]COPY     Family History:  History reviewed.  No pertinent family Laboratory Tests:  Recent Labs     02/15/20  0223      K 3.8      CO2 23   BUN 11   CREATININE 0.7   GLUCOSE 115*   CALCIUM 9.4     Lab Results   Component Value Date    MG 2.0 02/01/2020     Recent Labs     02/15/20  0223   ALKPHOS 73   ALT 21   AST 21   PROT 7.3   BILITOT 0.4   LABALBU 4.1     Recent Labs     02/15/20  0223   WBC 11.4   RBC 4.82   HGB 12.9   HCT 41.5   MCV 86.1   MCH 26.8   MCHC 31.1*   RDW 14.5      MPV 9.9     Lab Results   Component Value Date    CKTOTAL 101 02/05/2014    CKMB 2.8 02/05/2014    TROPONINI <0.01 02/15/2020    TROPONINI <0.01 02/15/2020    TROPONINI 0.51 (H) 02/01/2020     Lab Results   Component Value Date    INR 0.9 02/15/2020    INR 1.0 01/31/2020    INR 1.1 02/05/2014    PROTIME 11.2 02/15/2020    PROTIME 12.4 01/31/2020    PROTIME 12.5 02/05/2014     No results found for: TSHFT4, TSH  No results found for: LABA1C  No results found for: EAG  Lab Results   Component Value Date    CHOL 221 (H) 02/01/2020    CHOL 314 (H) 10/12/2018    CHOL 284 (H) 01/28/2015     Lab Results   Component Value Date    TRIG 155 (H) 02/01/2020    TRIG 320 (H) 10/12/2018    TRIG 180 (H) 01/28/2015     Lab Results   Component Value Date    HDL 34 02/01/2020    HDL 39 10/12/2018    HDL 57 01/28/2015     Lab Results   Component Value Date    LDLCALC 156 (H) 02/01/2020    LDLCALC 211 (H) 10/12/2018    LDLCALC 191 (H) 01/28/2015     Lab Results   Component Value Date    LABVLDL 31 02/01/2020    LABVLDL 64 10/12/2018    LABVLDL 36 01/28/2015     No results found for: CHOLHDLRATIO  Recent Labs     02/15/20  0223   PROBNP 321*       Cardiac Tests:  ECG: SR, old IWMI pattern, LAD, PRWP    Telemetry findings reviewed: SR, rate 90's    Echocardiogram: 2/1/2020 (Dr. Macho Garber)   Ejection fraction is visually estimated at 65%. No regional wall motion abnormalities seen. Moderate left ventricular concentric hypertrophy noted. Normal left ventricular diastolic filling pattern for age.

## 2020-02-15 NOTE — PROGRESS NOTES
Physical Therapy    Facility/Department: [address] INTERMEDIATE  Initial Assessment    NAME: Oscar Snyder  : 1965  MRN: [id number]    Date of Service: 2/15/2020    Discharge Recommendations:      PT Equipment Recommendations  Equipment Needed: No    Assessment      No Skilled PT: Independent with functional mobility   REQUIRES PT FOLLOW UP: No  Activity Tolerance  Activity Tolerance: Patient Tolerated treatment well       Patient Diagnosis(es): The primary encounter diagnosis was Acute dyspnea. Diagnoses of History of coronary artery disease and Status post coronary artery stent placement were also pertinent to this visit. has a past medical history of Autoimmune disorder ([facility] CAD (coronary artery disease), DVT (deep venous thrombosis) ([facility] Heartburn, Hypertension, MI (myocardial infarction) ([facility] and Pemphigus vulgaris. has a past surgical history that includes  section; Hysterectomy; Foot surgery (Left); Colonoscopy; Upper gastrointestinal endoscopy; and Upper gastrointestinal endoscopy (N/A, 2019).     Restrictions  Restrictions/Precautions  Restrictions/Precautions: General Precautions  Required Braces or Orthoses?: No  Vision/Hearing  Vision: Within Functional Limits  Hearing: Within functional limits     Subjective  General  Chart Reviewed: Yes  Patient assessed for rehabilitation services?: Yes  Family / Caregiver Present: No  Follows Commands: Within Functional Limits  Pain Screening  Patient Currently in Pain: Denies  Vital Signs  Patient Currently in Pain: Denies       Orientation  Orientation  Overall Orientation Status: Within Normal Limits  Social/Functional History  Social/Functional History  Lives With: Alone  Type of Home: Apartment  Home Layout: One level  Home Access: Stairs to enter with rails  Entrance Stairs - Number of Steps: 5  Home Equipment: (No AD use PTA)  ADL Assistance: Independent  Homemaking Assistance: Independent  Ambulation Assistance:

## 2020-02-15 NOTE — H&P
MCV 86.1 02/15/2020    MCH 26.8 02/15/2020    MCHC 31.1 02/15/2020    RDW 14.5 02/15/2020    SEGSPCT 81 02/05/2014    LYMPHOPCT 32.3 02/15/2020    MONOPCT 9.3 02/15/2020    BASOPCT 0.4 02/15/2020    MONOSABS 1.06 02/15/2020    LYMPHSABS 3.68 02/15/2020    EOSABS 0.05 02/15/2020    BASOSABS 0.04 02/15/2020     CMP:    Lab Results   Component Value Date     02/15/2020    K 3.8 02/15/2020     02/15/2020    CO2 23 02/15/2020    BUN 11 02/15/2020    CREATININE 0.7 02/15/2020    GFRAA >60 02/15/2020    LABGLOM >60 02/15/2020    GLUCOSE 115 02/15/2020    GLUCOSE 89 02/24/2012    PROT 7.3 02/15/2020    LABALBU 4.1 02/15/2020    LABALBU 3.6 02/24/2012    CALCIUM 9.4 02/15/2020    BILITOT 0.4 02/15/2020    ALKPHOS 73 02/15/2020    AST 21 02/15/2020    ALT 21 02/15/2020     BMP:    Lab Results   Component Value Date     02/15/2020    K 3.8 02/15/2020     02/15/2020    CO2 23 02/15/2020    BUN 11 02/15/2020    LABALBU 4.1 02/15/2020    LABALBU 3.6 02/24/2012    CREATININE 0.7 02/15/2020    CALCIUM 9.4 02/15/2020    GFRAA >60 02/15/2020    LABGLOM >60 02/15/2020    GLUCOSE 115 02/15/2020    GLUCOSE 89 02/24/2012     Magnesium:    Lab Results   Component Value Date    MG 2.0 02/01/2020     Phosphorus:  No results found for: PHOS  PT/INR:    Lab Results   Component Value Date    PROTIME 11.2 02/15/2020    INR 0.9 02/15/2020     PTT:    Lab Results   Component Value Date    APTT 28.0 02/15/2020   [APTT}  Troponin:    Lab Results   Component Value Date    TROPONINI <0.01 02/15/2020     Last 3 Troponin:    Lab Results   Component Value Date    TROPONINI <0.01 02/15/2020    TROPONINI 0.51 02/01/2020    TROPONINI 0.46 01/31/2020     U/A:  No results found for: NITRITE, COLORU, PROTEINU, PHUR, LABCAST, WBCUA, RBCUA, MUCUS, TRICHOMONAS, YEAST, BACTERIA, CLARITYU, SPECGRAV, LEUKOCYTESUR, UROBILINOGEN, BILIRUBINUR, BLOODU, GLUCOSEU, AMORPHOUS  HgBA1c:  No results found for: LABA1C  FLP:    Lab Results   Component Value Date    TRIG 155 02/01/2020    HDL 34 02/01/2020    LDLCALC 156 02/01/2020    LABVLDL 31 02/01/2020     TSH:  No results found for: TSH    ASSESSMENT:      Patient Active Problem List   Diagnosis    Obesity (BMI 30-39. 9)    CAD (coronary artery disease), native coronary artery    Hyperlipidemia LDL goal <100    Chest pain         PLAN:    Continue to encourage weight loss. Cardiology consulted for ischemic evaluation, again. Continue medical management of ASCAD. CTA chest on 1/31 AND 2/15 negative for acute pathology. Sounds like brilanta side effect. Discontinue and switch to effient. Continue aggressive lipid therapy  As above. Discharge soon.     Cayla Mosquera MD  8:41 AM  2/15/2020

## 2020-02-16 VITALS
WEIGHT: 190 LBS | TEMPERATURE: 97.4 F | BODY MASS INDEX: 32.44 KG/M2 | OXYGEN SATURATION: 96 % | HEIGHT: 64 IN | RESPIRATION RATE: 16 BRPM | SYSTOLIC BLOOD PRESSURE: 112 MMHG | DIASTOLIC BLOOD PRESSURE: 78 MMHG | HEART RATE: 80 BPM

## 2020-02-16 LAB
ANION GAP SERPL CALCULATED.3IONS-SCNC: 11 MMOL/L (ref 7–16)
BUN BLDV-MCNC: 12 MG/DL (ref 6–20)
CALCIUM SERPL-MCNC: 9.1 MG/DL (ref 8.6–10.2)
CHLORIDE BLD-SCNC: 108 MMOL/L (ref 98–107)
CHOLESTEROL, TOTAL: 163 MG/DL (ref 0–199)
CO2: 20 MMOL/L (ref 22–29)
CREAT SERPL-MCNC: 0.6 MG/DL (ref 0.5–1)
GFR AFRICAN AMERICAN: >60
GFR NON-AFRICAN AMERICAN: >60 ML/MIN/1.73
GLUCOSE BLD-MCNC: 132 MG/DL (ref 74–99)
HCT VFR BLD CALC: 39.1 % (ref 34–48)
HDLC SERPL-MCNC: 47 MG/DL
HEMOGLOBIN: 12 G/DL (ref 11.5–15.5)
LDL CHOLESTEROL CALCULATED: 98 MG/DL (ref 0–99)
MAGNESIUM: 2.4 MG/DL (ref 1.6–2.6)
MCH RBC QN AUTO: 26.8 PG (ref 26–35)
MCHC RBC AUTO-ENTMCNC: 30.7 % (ref 32–34.5)
MCV RBC AUTO: 87.3 FL (ref 80–99.9)
PDW BLD-RTO: 14.6 FL (ref 11.5–15)
PLATELET # BLD: 260 E9/L (ref 130–450)
PMV BLD AUTO: 9.6 FL (ref 7–12)
POTASSIUM REFLEX MAGNESIUM: 3.9 MMOL/L (ref 3.5–5)
RBC # BLD: 4.48 E12/L (ref 3.5–5.5)
SODIUM BLD-SCNC: 139 MMOL/L (ref 132–146)
TRIGL SERPL-MCNC: 92 MG/DL (ref 0–149)
VLDLC SERPL CALC-MCNC: 18 MG/DL
WBC # BLD: 15.7 E9/L (ref 4.5–11.5)

## 2020-02-16 PROCEDURE — 85027 COMPLETE CBC AUTOMATED: CPT

## 2020-02-16 PROCEDURE — 6360000002 HC RX W HCPCS: Performed by: NURSE PRACTITIONER

## 2020-02-16 PROCEDURE — 80061 LIPID PANEL: CPT

## 2020-02-16 PROCEDURE — G0378 HOSPITAL OBSERVATION PER HR: HCPCS

## 2020-02-16 PROCEDURE — 6370000000 HC RX 637 (ALT 250 FOR IP): Performed by: NURSE PRACTITIONER

## 2020-02-16 PROCEDURE — 83735 ASSAY OF MAGNESIUM: CPT

## 2020-02-16 PROCEDURE — 96372 THER/PROPH/DIAG INJ SC/IM: CPT

## 2020-02-16 PROCEDURE — 80048 BASIC METABOLIC PNL TOTAL CA: CPT

## 2020-02-16 PROCEDURE — 6370000000 HC RX 637 (ALT 250 FOR IP): Performed by: INTERNAL MEDICINE

## 2020-02-16 PROCEDURE — 36415 COLL VENOUS BLD VENIPUNCTURE: CPT

## 2020-02-16 PROCEDURE — 99214 OFFICE O/P EST MOD 30 MIN: CPT | Performed by: INTERNAL MEDICINE

## 2020-02-16 PROCEDURE — 2580000003 HC RX 258: Performed by: NURSE PRACTITIONER

## 2020-02-16 RX ORDER — PRASUGREL 10 MG/1
10 TABLET, FILM COATED ORAL DAILY
Qty: 30 TABLET | Refills: 0 | Status: SHIPPED | OUTPATIENT
Start: 2020-02-16 | End: 2020-03-09 | Stop reason: SDUPTHER

## 2020-02-16 RX ADMIN — METOPROLOL SUCCINATE 25 MG: 25 TABLET, EXTENDED RELEASE ORAL at 08:13

## 2020-02-16 RX ADMIN — POTASSIUM CHLORIDE 20 MEQ: 20 TABLET, EXTENDED RELEASE ORAL at 08:13

## 2020-02-16 RX ADMIN — ENOXAPARIN SODIUM 40 MG: 40 INJECTION SUBCUTANEOUS at 08:13

## 2020-02-16 RX ADMIN — ASPIRIN 81 MG 81 MG: 81 TABLET ORAL at 08:13

## 2020-02-16 RX ADMIN — PANTOPRAZOLE SODIUM 40 MG: 40 TABLET, DELAYED RELEASE ORAL at 05:21

## 2020-02-16 RX ADMIN — PRASUGREL 10 MG: 10 TABLET, FILM COATED ORAL at 08:13

## 2020-02-16 RX ADMIN — PREDNISONE 10 MG: 5 TABLET ORAL at 08:13

## 2020-02-16 RX ADMIN — SODIUM CHLORIDE, PRESERVATIVE FREE 10 ML: 5 INJECTION INTRAVENOUS at 08:14

## 2020-02-16 RX ADMIN — AZATHIOPRINE 50 MG: 50 TABLET ORAL at 08:13

## 2020-02-16 ASSESSMENT — PAIN SCALES - GENERAL
PAINLEVEL_OUTOF10: 0
PAINLEVEL_OUTOF10: 0

## 2020-02-16 ASSESSMENT — PAIN DESCRIPTION - PROGRESSION: CLINICAL_PROGRESSION: GRADUALLY WORSENING

## 2020-02-16 NOTE — PROGRESS NOTES
Subjective: The patient is awake and alert. No problems overnight. Denies chest pain, angina, and dyspnea. Denies abdominal pain. Tolerating diet. No nausea or vomiting. Objective:    /78   Pulse 80   Temp 97.4 °F (36.3 °C) (Oral)   Resp 16   Ht 5' 4\" (1.626 m)   Wt 190 lb (86.2 kg)   SpO2 96%   BMI 32.61 kg/m²     Current medications that patient is taking have been reviewed. Heart:  RRR, no murmurs, gallops, or rubs.   Lungs:  CTA bilaterally, no wheeze, rales or rhonchi  Abd: bowel sounds present, soft, nontender, nondistended, no masses  Extrem:  No cyanosis or edema    CBC with Differential:    Lab Results   Component Value Date    WBC 15.7 02/16/2020    RBC 4.48 02/16/2020    HGB 12.0 02/16/2020    HCT 39.1 02/16/2020     02/16/2020    MCV 87.3 02/16/2020    MCH 26.8 02/16/2020    MCHC 30.7 02/16/2020    RDW 14.6 02/16/2020    SEGSPCT 81 02/05/2014    LYMPHOPCT 32.3 02/15/2020    MONOPCT 9.3 02/15/2020    BASOPCT 0.4 02/15/2020    MONOSABS 1.06 02/15/2020    LYMPHSABS 3.68 02/15/2020    EOSABS 0.05 02/15/2020    BASOSABS 0.04 02/15/2020     CMP:    Lab Results   Component Value Date     02/16/2020    K 3.9 02/16/2020     02/16/2020    CO2 20 02/16/2020    BUN 12 02/16/2020    CREATININE 0.6 02/16/2020    GFRAA >60 02/16/2020    LABGLOM >60 02/16/2020    GLUCOSE 132 02/16/2020    GLUCOSE 89 02/24/2012    PROT 7.3 02/15/2020    LABALBU 4.1 02/15/2020    LABALBU 3.6 02/24/2012    CALCIUM 9.1 02/16/2020    BILITOT 0.4 02/15/2020    ALKPHOS 73 02/15/2020    AST 21 02/15/2020    ALT 21 02/15/2020     BMP:    Lab Results   Component Value Date     02/16/2020    K 3.9 02/16/2020     02/16/2020    CO2 20 02/16/2020    BUN 12 02/16/2020    LABALBU 4.1 02/15/2020    LABALBU 3.6 02/24/2012    CREATININE 0.6 02/16/2020    CALCIUM 9.1 02/16/2020    GFRAA >60 02/16/2020    LABGLOM >60 02/16/2020    GLUCOSE 132 02/16/2020    GLUCOSE 89 02/24/2012     Magnesium:    Lab Results   Component Value Date    MG 2.4 02/16/2020     Phosphorus:  No results found for: PHOS  PT/INR:    Lab Results   Component Value Date    PROTIME 11.2 02/15/2020    INR 0.9 02/15/2020     PTT:    Lab Results   Component Value Date    APTT 28.0 02/15/2020   [APTT}     Assessment:    Patient Active Problem List   Diagnosis    Obesity (BMI 30-39. 9)    CAD (coronary artery disease), native coronary artery    Hyperlipidemia LDL goal <100    Chest pain       Plan:    Continue to encourage weight loss. Continue medical management of ASCAD. Continue aggressive lipid therapy  ACS ruled out. Switched off of [place]. Ok to discharge once ok with cardiology.     Carol Ann Cueto    11:24 AM  2/16/2020

## 2020-02-16 NOTE — DISCHARGE SUMMARY
Physician Discharge Summary     Patient ID:  Shira Patterson  [id number]  47 y.o.  1965    Admit date: 2/15/2020    Discharge date and time:  2/16/2020    Admission Diagnoses:   Patient Active Problem List   Diagnosis    Obesity (BMI 30-39. 9)    CAD (coronary artery disease), native coronary artery    Hyperlipidemia LDL goal <100    Chest pain       Discharge Diagnoses: as above    Consults: cardiology    Procedures: see chart    Hospital Course: patient was admitted with chest pain and shortness of breath. She had recently suffered a STEMI and was status post PCI with MICHELLE placement. She was on brilanta. It was felt the brilanta was causing side effects. She was switched to prasugrel. She was discharged in stable condition. Discharge Exam:  See progress note from today    Condition:  stable    Disposition: home    Patient Instructions:   Current Discharge Medication List      START taking these medications    Details   prasugrel (EFFIENT) 10 MG TABS Take 1 tablet by mouth daily  Qty: 30 tablet, Refills: 0         CONTINUE these medications which have NOT CHANGED    Details   aspirin 81 MG chewable tablet Take 1 tablet by mouth daily  Qty: 30 tablet, Refills: 0      atorvastatin (LIPITOR) 80 MG tablet Take 1 tablet by mouth nightly  Qty: 30 tablet, Refills: 0      metoprolol succinate (TOPROL XL) 25 MG extended release tablet Take 1 tablet by mouth daily  Qty: 30 tablet, Refills: 0      pantoprazole (PROTONIX) 40 MG tablet Refills: 0      desonide (DESOWEN) 0.05 % ointment Refills: 0      oxyCODONE-acetaminophen (PERCOCET) 7.5-325 MG per tablet Take 1 tablet by mouth every 4 hours as needed for Pain. Brooklynn Jaffe       azaTHIOprine (IMURAN) 50 MG tablet Take 50 mg by mouth daily      predniSONE (DELTASONE) 20 MG tablet Take 10 mg by mouth 2 times daily Instructed to take am of procedure      potassium chloride (KLOR-CON) 10 MEQ CR tablet Take 20 mEq by mouth 2 times daily          STOP taking these medications ticagrelor (BRILINTA) 90 MG TABS tablet Comments:   Reason for Stopping:             Activity: activity as tolerated  Diet: low fat, low cholesterol diet    Follow up with dr Douglas Bentley in 1 week. Follow up with dr Dorothy curtis in 2-3 weeks.       Note that over 30 minutes was spent in preparing discharge papers, discussing discharge with patient, medication review, etc.    Signed:  Jad Luciano    2/16/2020  8:09 AM

## 2020-02-16 NOTE — PROGRESS NOTES
INPATIENT CARDIOLOGY FOLLOW-UP    Name: Chanel Burleson    Age: 47 y.o. Date of Service: 2020    Chief Complaint: CAD, history of STEMI, SOB    Referring Physician: Tiffanie Bowling MD    History of Present Illness:  Chanel Burleson is a 47 y.o. female (recently evaluated by Dr. Sheree Swenson as an inpatient) who presented to [facility] on 2/15/2020 for further evaluation of progressive SOB. Her PMH is outlined in detail below (see A/P below), and includes inferior STEMI s/p staged PCI in 2020 [place], [place] where patient works; see cath results below). She denies recent exertional chest pain, palpitations, or syncope. No history of PND or orthopnea. +\"profound\" shortness of breath at rest and with exertion since Brilinta started last month (no improvement over the past ~ 3 weeks). She is currently with no active cardiac complaints at rest. SR on EKG and telemetry. Brilinta switched to effient yesterday --> SOB resolved. She is currently with no active cardiac complaints. She wants to go home.     Review of Systems:   Cardiac: As per HPI  General: No fever, chills  Pulmonary: As per HPI  HEENT: No visual disturbances, difficult swallowing  GI: No nausea, vomiting  Endocrine: No thyroid disease or DM  Musculoskeletal: GIRON x 4, no focal motor deficits  Skin: Intact, no rashes  Neuro/Psych: No headache or seizures    Past Medical History:  Past Medical History:   Diagnosis Date    [facility] [address]     CAD (coronary artery disease)     DVT (deep venous thrombosis) ([facility] [address]     right leg    Heartburn     Hypertension     MI (myocardial infarction) ([facility] [address]     2020    Pemphigus vulgaris        Past Surgical History:  Past Surgical History:   Procedure Laterality Date     SECTION      COLONOSCOPY      FOOT SURGERY Left     HYSTERECTOMY      partial hysterectomy    UPPER GASTROINTESTINAL ENDOSCOPY      UPPER GASTROINTESTINAL ENDOSCOPY N/A 2019    EGD BIOPSY performed by Lawyer Tasha MD at [facility] ENDOSCOPY     Family History:  History reviewed. No pertinent family history. Social History:  Social History     Socioeconomic History    Marital status: Single     Spouse name: Not on file    Number of children: Not on file    Years of education: Not on file    Highest education level: Not on file   Occupational History    Not on file   Social Needs    Financial resource strain: Not on file    Food insecurity:     Worry: Not on file     Inability: Not on file    Transportation needs:     Medical: Not on file     Non-medical: Not on file   Tobacco Use    Smoking status: Never Smoker    Smokeless tobacco: Never Used   Substance and Sexual Activity    Alcohol use: Yes     Alcohol/week: 1.0 standard drinks     Types: 1 Glasses of wine per week    Drug use: No    Sexual activity: Not on file   Lifestyle    Physical activity:     Days per week: Not on file     Minutes per session: Not on file    Stress: Not on file   Relationships    Social connections:     Talks on phone: Not on file     Gets together: Not on file     Attends Confucianist service: Not on file     Active member of club or organization: Not on file     Attends meetings of clubs or organizations: Not on file     Relationship status: Not on file    Intimate partner violence:     Fear of current or ex partner: Not on file     Emotionally abused: Not on file     Physically abused: Not on file     Forced sexual activity: Not on file   Other Topics Concern    Not on file   Social History Narrative    Not on file       Allergies:  No Known Allergies    Home Medications:  Prior to Admission medications    Medication Sig Start Date End Date Taking?  Authorizing Provider   prasugrel (EFFIENT) 10 MG TABS Take 1 tablet by mouth daily 2/16/20  Yes Carol Ann Cueto MD   aspirin 81 MG chewable tablet Take 1 tablet by mouth daily 2/3/20  Yes Carol Ann Cueto MD   atorvastatin (LIPITOR) 80 MG tablet Take 1 tablet by mouth nightly 2/2/20  Yes Intravenous Q6H PRN Roni Daisy Humfleet, APRN - CNP        enoxaparin (LOVENOX) injection 40 mg  40 mg Subcutaneous Daily Roni Daisy Humfleet, APRN - CNP   40 mg at 02/16/20 0813    acetaminophen (TYLENOL) tablet 650 mg  650 mg Oral Q4H PRN Memo Guillen, APRN - CNP   650 mg at 02/15/20 1033    oxyCODONE-acetaminophen (PERCOCET) 5-325 MG per tablet 1 tablet  1 tablet Oral Q4H PRN Memo [facility], APRN - CNP   1 tablet at 02/15/20 2010    And    oxyCODONE (ROXICODONE) immediate release tablet 2.5 mg  2.5 mg Oral Q4H PRN Memo [facility], APRN - CNP   2.5 mg at 02/15/20 2010    predniSONE (DELTASONE) tablet 10 mg  10 mg Oral BID Jessica Wright MD   10 mg at 02/16/20 0813    potassium chloride (KLOR-CON M) extended release tablet 20 mEq  20 mEq Oral BID WC Jessica Wright MD   20 mEq at 02/16/20 0813    prasugrel (EFFIENT) tablet 10 mg  10 mg Oral Daily Lulu Russo MD   10 mg at 02/16/20 0813         Physical Exam:  /78   Pulse 80   Temp 97.4 °F (36.3 °C) (Oral)   Resp 16   Ht 5' 4\" (1.626 m)   Wt 190 lb (86.2 kg)   SpO2 96%   BMI 32.61 kg/m²   Wt Readings from Last 3 Encounters:   02/15/20 190 lb (86.2 kg)   02/02/20 190 lb (86.2 kg)   07/30/19 180 lb (81.6 kg)     Appearance: Awake, alert, no acute respiratory distress  Skin: Intact, no rash  Head: Normocephalic, atraumatic  Eyes: EOMI, no conjunctival erythema  ENMT: No pharyngeal erythema, MMM, no rhinorrhea  Neck: Supple, no elevated JVP, no carotid bruits  Lungs: Clear to auscultation bilaterally. No wheezes, rales, or rhonchi.   Cardiac: Regular rate and rhythm, +S1S2, no murmurs apparent  Abdomen: Soft, nontender, +bowel sounds  Extremities: Moves all extremities x 4, no lower extremity edema  Neurologic: No focal motor deficits apparent, normal mood and affect  Peripheral Pulses: Intact posterior tibial pulses bilaterally    Intake/Output:    Intake/Output Summary (Last 24 hours) at 2/16/2020 1012  Last data filed at

## 2020-02-17 ENCOUNTER — TELEPHONE (OUTPATIENT)
Dept: CARDIOLOGY CLINIC | Age: 55
End: 2020-02-17

## 2020-02-17 PROBLEM — I10 HYPERTENSION: Status: ACTIVE | Noted: 2020-02-17

## 2020-02-17 NOTE — TELEPHONE ENCOUNTER
MD Messi Benton             She was evaluated in consult by Apollo Gee recently and evaluated in consult by me this admission. She wants to follow-up with me. Follow-up with me in the next 2-4 weeks (ok to add on if needed). Patient notified of Dr. Junior Sanchez recommendation. F/U scheduled for 3/9/20 at 3:15 p.m. Patient inquiring when she can return to work. Please advise.

## 2020-02-18 NOTE — TELEPHONE ENCOUNTER
Patient notified of Dr. Rosa Sheldon recommendation. Patient will call back with fax number so we may send a note to her employer.

## 2020-02-27 NOTE — TELEPHONE ENCOUNTER
Patient called with contact information for employer, would like to return to work on 3/6/20. [employer] [phone]    [address]  [phone]    Letter done and faxed.

## 2020-03-09 ENCOUNTER — OFFICE VISIT (OUTPATIENT)
Dept: CARDIOLOGY CLINIC | Age: 55
End: 2020-03-09
Payer: COMMERCIAL

## 2020-03-09 VITALS
RESPIRATION RATE: 18 BRPM | HEART RATE: 83 BPM | SYSTOLIC BLOOD PRESSURE: 122 MMHG | DIASTOLIC BLOOD PRESSURE: 78 MMHG | BODY MASS INDEX: 34.06 KG/M2 | HEIGHT: 64 IN | WEIGHT: 199.5 LBS

## 2020-03-09 PROCEDURE — 99214 OFFICE O/P EST MOD 30 MIN: CPT | Performed by: INTERNAL MEDICINE

## 2020-03-09 PROCEDURE — 1036F TOBACCO NON-USER: CPT | Performed by: INTERNAL MEDICINE

## 2020-03-09 PROCEDURE — 93000 ELECTROCARDIOGRAM COMPLETE: CPT | Performed by: INTERNAL MEDICINE

## 2020-03-09 PROCEDURE — G8417 CALC BMI ABV UP PARAM F/U: HCPCS | Performed by: INTERNAL MEDICINE

## 2020-03-09 PROCEDURE — G8484 FLU IMMUNIZE NO ADMIN: HCPCS | Performed by: INTERNAL MEDICINE

## 2020-03-09 PROCEDURE — 3017F COLORECTAL CA SCREEN DOC REV: CPT | Performed by: INTERNAL MEDICINE

## 2020-03-09 PROCEDURE — G8427 DOCREV CUR MEDS BY ELIG CLIN: HCPCS | Performed by: INTERNAL MEDICINE

## 2020-03-09 RX ORDER — METOPROLOL SUCCINATE 25 MG/1
25 TABLET, EXTENDED RELEASE ORAL DAILY
Qty: 90 TABLET | Refills: 3 | Status: SHIPPED | OUTPATIENT
Start: 2020-03-09

## 2020-03-09 RX ORDER — PRASUGREL 10 MG/1
10 TABLET, FILM COATED ORAL DAILY
Qty: 90 TABLET | Refills: 3 | Status: SHIPPED | OUTPATIENT
Start: 2020-03-09

## 2020-03-09 RX ORDER — ATORVASTATIN CALCIUM 80 MG/1
80 TABLET, FILM COATED ORAL NIGHTLY
Qty: 90 TABLET | Refills: 3 | Status: SHIPPED
Start: 2020-03-09 | End: 2021-04-16

## 2020-03-09 NOTE — PROGRESS NOTES
OUTPATIENT CARDIOLOGY FOLLOW-UP    Name: Na Ludwig    Age: 47 y.o. Date of Service: 3/9/2020    Chief Complaint: CAD, history of STEMI, SOB    Referring Physician: Lulu Conroy MD    History of Present Illness:  Na Ludwig is a 47 y.o. female who presented to [facility] on 2/15/2020 for further evaluation of progressive SOB. Her PMH is outlined in detail below (see A/P below), and includes inferior STEMI s/p staged [facility] [address] [place] [address] where patient works; see cath results below). She denies recent exertional chest pain, palpitations, or syncope. No history of PND or orthopnea. +\"profound\" shortness of breath at rest and with exertion after Brilinta started in 2020 (no improvement for ~ 3 weeks leading up to 2020 hospitalization). She is currently with no active cardiac complaints at rest. SR on EKG and telemetry. Brilinta switched to effient on 2/15/2020 --> SOB resolved. She is currently with no active cardiac complaints. Review of Systems:   Cardiac: As per HPI  General: No fever, chills  Pulmonary: As per HPI  HEENT: No visual disturbances, difficult swallowing  GI: No nausea, vomiting  Endocrine: No thyroid disease or DM  Musculoskeletal: GIRON x 4, no focal motor deficits  Skin: Intact, no rashes  Neuro/Psych: No headache or seizures    Past Medical History:  Past Medical History:   Diagnosis Date    DVT (deep venous thrombosis) ([address] 2019    right leg    Hypertension        Past Surgical History:  Past Surgical History:   Procedure Laterality Date     SECTION      COLONOSCOPY      FOOT SURGERY Left     HYSTERECTOMY      partial hysterectomy    UPPER GASTROINTESTINAL ENDOSCOPY      UPPER GASTROINTESTINAL ENDOSCOPY N/A 2019    EGD BIOPSY [facility] Denny Milton MD [facility] ENDOSCOPY     Family History:  No family history on file.     Social History:  Social History     Socioeconomic History    Marital status: Single     Spouse name: Not on file    Number of Graciela Phoenix, MD   pantoprazole (PROTONIX) 40 MG tablet  6/24/19  Yes Historical Provider, MD   desonide (DESOWEN) 0.05 % ointment  6/24/19  Yes Historical Provider, MD   oxyCODONE-acetaminophen (PERCOCET) 7.5-325 MG per tablet Take 1 tablet by mouth every 4 hours as needed for Pain. .   Yes Historical Provider, MD   azaTHIOprine (IMURAN) 50 MG tablet Take 50 mg by mouth daily   Yes Historical Provider, MD   predniSONE (DELTASONE) 20 MG tablet Take 10 mg by mouth 2 times daily Instructed to take am of procedure   Yes Historical Provider, MD   potassium chloride (KLOR-CON) 10 MEQ CR tablet Take 20 mEq by mouth 2 times daily    Yes Historical Provider, MD       Current Medications:  Current Outpatient Medications   Medication Sig Dispense Refill    Ergocalciferol (VITAMIN D2 PO) Take 1.25 mg by mouth every 14 days      atorvastatin (LIPITOR) 80 MG tablet Take 1 tablet by mouth nightly 90 tablet 3    metoprolol succinate (TOPROL XL) 25 MG extended release tablet Take 1 tablet by mouth daily 90 tablet 3    prasugrel (EFFIENT) 10 MG TABS Take 1 tablet by mouth daily 90 tablet 3    aspirin 81 MG chewable tablet Take 1 tablet by mouth daily 30 tablet 0    pantoprazole (PROTONIX) 40 MG tablet   0    desonide (DESOWEN) 0.05 % ointment   0    oxyCODONE-acetaminophen (PERCOCET) 7.5-325 MG per tablet Take 1 tablet by mouth every 4 hours as needed for Pain. Blake Crate azaTHIOprine (IMURAN) 50 MG tablet Take 50 mg by mouth daily      predniSONE (DELTASONE) 20 MG tablet Take 10 mg by mouth 2 times daily Instructed to take am of procedure      potassium chloride (KLOR-CON) 10 MEQ CR tablet Take 20 mEq by mouth 2 times daily        No current facility-administered medications for this visit.           Physical Exam:  /78   Pulse 83   Resp 18   Ht 5' 4\" (1.626 m)   Wt 199 lb 8 oz (90.5 kg)   BMI 34.24 kg/m²   Wt Readings from Last 3 Encounters:   03/09/20 199 lb 8 oz (90.5 kg)   02/15/20 190 lb (86.2 kg)   02/02/20 190 lb (86.2 kg)     Appearance: Awake, alert, no acute respiratory distress  Skin: Intact, no rash  Head: Normocephalic, atraumatic  Eyes: EOMI, no conjunctival erythema  ENMT: No pharyngeal erythema, MMM, no rhinorrhea  Neck: Supple, no elevated JVP, no carotid bruits  Lungs: Clear to auscultation bilaterally. No wheezes, rales, or rhonchi.   Cardiac: Regular rate and rhythm, +S1S2, no murmurs apparent  Abdomen: Soft, nontender, +bowel sounds  Extremities: Moves all extremities x 4, no lower extremity edema  Neurologic: No focal motor deficits apparent, normal mood and affect  Peripheral Pulses: Intact posterior tibial pulses bilaterally    Intake/Output:  No intake or output data in the 24 hours ending 03/09/20 1555  I/O this shift:  In: 120 [P.O.:120]  Out: -     Laboratory Tests:  Lab Results   Component Value Date    CREATININE 0.6 02/16/2020    BUN 12 02/16/2020     02/16/2020    K 3.9 02/16/2020     (H) 02/16/2020    CO2 20 (L) 02/16/2020     Lab Results   Component Value Date    MG 2.4 02/16/2020     Lab Results   Component Value Date    ALT 21 02/15/2020    AST 21 02/15/2020    ALKPHOS 73 02/15/2020    BILITOT 0.4 02/15/2020     Lab Results   Component Value Date    WBC 15.7 (H) 02/16/2020    HGB 12.0 02/16/2020    HCT 39.1 02/16/2020    MCV 87.3 02/16/2020     02/16/2020     Lab Results   Component Value Date    CKTOTAL 101 02/05/2014    CKMB 2.8 02/05/2014    TROPONINI <0.01 02/15/2020    TROPONINI <0.01 02/15/2020    TROPONINI <0.01 02/15/2020     Lab Results   Component Value Date    INR 0.9 02/15/2020    INR 1.0 01/31/2020    INR 1.1 02/05/2014    PROTIME 11.2 02/15/2020    PROTIME 12.4 01/31/2020    PROTIME 12.5 02/05/2014     Lab Results   Component Value Date    CHOL 163 02/16/2020    CHOL 221 (H) 02/01/2020    CHOL 314 (H) 10/12/2018     Lab Results   Component Value Date    TRIG 92 02/16/2020    TRIG 155 (H) 02/01/2020    TRIG 320 (H) 10/12/2018     Lab Results   Component Value

## 2020-03-16 ENCOUNTER — TELEPHONE (OUTPATIENT)
Dept: CARDIAC REHAB | Age: 55
End: 2020-03-16

## 2020-03-16 NOTE — TELEPHONE ENCOUNTER
3/16/2020 1400 Nutrition:   Subjective:  Spoke with patient by phone per pt request regarding allowed foods to eat due to heart condition and unable to attend evaluation due to department closure. Objective: Patient reports she is 5'4' weight; 180 pounds; not diabetic; had MI and s/p stents x3; (per chart, obesity, CAD, Hypertension) unable to eat shellfish or other fish; Patient received verbal education on My Plate, limiting sodium to 1500 mg/day; limiting high cholesterol foods, limiting high saturated fats in diet and avoiding trans fat. Patient verbalized understanding. Patient mailed information on My Plate, Meal Planning Made Easy, Reaching Nutrition Goals, Serving Size, Heart Healthy Label Reading, Heart Healthy Low Sodium Intake. Contact information provided. Plan: To schedule RD  1:1 visit when department reopens.  Electronically signed by Jeovanny Wills RD, AMINATA on 3/16/20 at 2:17 PM EDT

## 2020-03-19 ENCOUNTER — TELEPHONE (OUTPATIENT)
Dept: CARDIAC REHAB | Age: 55
End: 2020-03-19

## 2020-05-19 ENCOUNTER — TELEPHONE (OUTPATIENT)
Dept: CARDIOLOGY CLINIC | Age: 55
End: 2020-05-19

## 2020-05-19 NOTE — TELEPHONE ENCOUNTER
Patient called stating she has been having intermittent symptoms of indigestion (or what she thinks is indigestion) and shortness of breath at rest and with exertion. Symptoms have been occurring off and on for the last couple of weeks. She did have a recent STEMI with stents. Please advise.

## 2020-05-20 ENCOUNTER — OFFICE VISIT (OUTPATIENT)
Dept: CARDIOLOGY CLINIC | Age: 55
End: 2020-05-20
Payer: COMMERCIAL

## 2020-05-20 VITALS
HEART RATE: 81 BPM | HEIGHT: 63 IN | WEIGHT: 199 LBS | RESPIRATION RATE: 16 BRPM | BODY MASS INDEX: 35.26 KG/M2 | DIASTOLIC BLOOD PRESSURE: 62 MMHG | SYSTOLIC BLOOD PRESSURE: 132 MMHG

## 2020-05-20 PROCEDURE — 3017F COLORECTAL CA SCREEN DOC REV: CPT | Performed by: INTERNAL MEDICINE

## 2020-05-20 PROCEDURE — 1036F TOBACCO NON-USER: CPT | Performed by: INTERNAL MEDICINE

## 2020-05-20 PROCEDURE — G8417 CALC BMI ABV UP PARAM F/U: HCPCS | Performed by: INTERNAL MEDICINE

## 2020-05-20 PROCEDURE — 99214 OFFICE O/P EST MOD 30 MIN: CPT | Performed by: INTERNAL MEDICINE

## 2020-05-20 PROCEDURE — 93000 ELECTROCARDIOGRAM COMPLETE: CPT | Performed by: INTERNAL MEDICINE

## 2020-05-20 PROCEDURE — G8427 DOCREV CUR MEDS BY ELIG CLIN: HCPCS | Performed by: INTERNAL MEDICINE

## 2020-05-20 NOTE — PROGRESS NOTES
OUTPATIENT CARDIOLOGY FOLLOW-UP    Name: Marce Choe    Age: 54 y.o. Date of Service: 2020    Chief Complaint: CAD, history of STEMI, SOB    Referring Physician: Lisa Newman MD    History of Present Illness:  Marce Choe is a 54 y.o. female who presented to [facility] on 2/15/2020 for further evaluation of progressive SOB. Her PMH is outlined in detail below (see A/P below), and includes inferior STEMI s/p staged PCI in 2020 [place], [place] where patient works; see cath results below). She denies recent exertional chest pain, palpitations, or syncope. No history of PND or orthopnea. +\"profound\" shortness of breath at rest and with exertion after Brilinta started in 2020 (no improvement for ~ 3 weeks leading up to 2020 hospitalization). She is currently with no active cardiac complaints at rest. SR on EKG and telemetry. Brilinta switched to effient on 2/15/2020 --> SOB resolved. +\"indigestion\" x 1 week (non-exertional, episodes last seconds-minutes). She is currently with no active cardiac complaints at rest.    Review of Systems:   Cardiac: As per HPI  General: No fever, chills  Pulmonary: As per HPI  HEENT: No visual disturbances, difficult swallowing  GI: No nausea, vomiting  Endocrine: No thyroid disease or DM  Musculoskeletal: GIRON x 4, no focal motor deficits  Skin: Intact, no rashes  Neuro/Psych: No headache or seizures    Past Medical History:  Past Medical History:   Diagnosis Date    DVT (deep venous thrombosis) ([facility] [address] 2019    right leg    Hypertension        Past Surgical History:  Past Surgical History:   Procedure Laterality Date     SECTION      COLONOSCOPY      FOOT SURGERY Left     HYSTERECTOMY      partial hysterectomy    UPPER GASTROINTESTINAL ENDOSCOPY      UPPER GASTROINTESTINAL ENDOSCOPY N/A 2019    EGD BIOPSY performed by Kwame Duval MD at [facility] ENDOSCOPY     Family History:  History reviewed. No pertinent family history.     Social History:  Social History     Socioeconomic History    Marital status: Single     Spouse name: Not on file    Number of children: Not on file    Years of education: Not on file    Highest education level: Not on file   Occupational History    Not on file   Social Needs    Financial resource strain: Not on file    Food insecurity     Worry: Not on file     Inability: Not on file    Transportation needs     Medical: Not on file     Non-medical: Not on file   Tobacco Use    Smoking status: Never Smoker    Smokeless tobacco: Never Used   Substance and Sexual Activity    Alcohol use: Yes     Alcohol/week: 1.0 standard drinks     Types: 1 Glasses of wine per week    Drug use: No    Sexual activity: Not Currently   Lifestyle    Physical activity     Days per week: Not on file     Minutes per session: Not on file    Stress: Not on file   Relationships    Social connections     Talks on phone: Not on file     Gets together: Not on file     Attends Spiritism service: Not on file     Active member of club or organization: Not on file     Attends meetings of clubs or organizations: Not on file     Relationship status: Not on file    Intimate partner violence     Fear of current or ex partner: Not on file     Emotionally abused: Not on file     Physically abused: Not on file     Forced sexual activity: Not on file   Other Topics Concern    Not on file   Social History Narrative    Not on file       Allergies:  No Known Allergies    Home Medications:  Prior to Admission medications    Medication Sig Start Date End Date Taking?  Authorizing Provider   Ergocalciferol (VITAMIN D2 PO) Take 1.25 mg by mouth once a week    Yes Historical Provider, MD   atorvastatin (LIPITOR) 80 MG tablet Take 1 tablet by mouth nightly 3/9/20  Yes Stanley Velasco MD   metoprolol succinate (TOPROL XL) 25 MG extended release tablet Take 1 tablet by mouth daily 3/9/20  Yes Stanley Velasco MD   prasugrel (EFFIENT) 10 MG TABS Take 1 tablet by mouth daily Last 3 Encounters:   05/20/20 199 lb (90.3 kg)   03/09/20 199 lb 8 oz (90.5 kg)   02/15/20 190 lb (86.2 kg)     Appearance: Awake, alert, no acute respiratory distress  Skin: Intact, no rash  Head: Normocephalic, atraumatic  Eyes: EOMI, no conjunctival erythema  ENMT: No pharyngeal erythema, MMM, no rhinorrhea  Neck: Supple, no elevated JVP, no carotid bruits  Lungs: Clear to auscultation bilaterally. No wheezes, rales, or rhonchi.   Cardiac: Regular rate and rhythm, +S1S2, no murmurs apparent  Abdomen: Soft, nontender, +bowel sounds  Extremities: Moves all extremities x 4, no lower extremity edema  Neurologic: No focal motor deficits apparent, normal mood and affect  Peripheral Pulses: Intact posterior tibial pulses bilaterally    Intake/Output:  No intake or output data in the 24 hours ending 05/20/20 1630  I/O this shift:  In: 120 [P.O.:120]  Out: -     Laboratory Tests:  Lab Results   Component Value Date    CREATININE 0.6 02/16/2020    BUN 12 02/16/2020     02/16/2020    K 3.9 02/16/2020     (H) 02/16/2020    CO2 20 (L) 02/16/2020     Lab Results   Component Value Date    MG 2.4 02/16/2020     Lab Results   Component Value Date    ALT 21 02/15/2020    AST 21 02/15/2020    ALKPHOS 73 02/15/2020    BILITOT 0.4 02/15/2020     Lab Results   Component Value Date    WBC 15.7 (H) 02/16/2020    HGB 12.0 02/16/2020    HCT 39.1 02/16/2020    MCV 87.3 02/16/2020     02/16/2020     Lab Results   Component Value Date    CKTOTAL 101 02/05/2014    CKMB 2.8 02/05/2014    TROPONINI <0.01 02/15/2020    TROPONINI <0.01 02/15/2020    TROPONINI <0.01 02/15/2020     Lab Results   Component Value Date    INR 0.9 02/15/2020    INR 1.0 01/31/2020    INR 1.1 02/05/2014    PROTIME 11.2 02/15/2020    PROTIME 12.4 01/31/2020    PROTIME 12.5 02/05/2014     Lab Results   Component Value Date    CHOL 163 02/16/2020    CHOL 221 (H) 02/01/2020    CHOL 314 (H) 10/12/2018     Lab Results   Component Value Date    TRIG 92

## 2020-06-08 ENCOUNTER — HOSPITAL ENCOUNTER (OUTPATIENT)
Dept: CARDIAC REHAB | Age: 55
Setting detail: THERAPIES SERIES
Discharge: HOME | End: 2020-06-08
Payer: COMMERCIAL

## 2020-06-08 VITALS
RESPIRATION RATE: 20 BRPM | BODY MASS INDEX: 35.05 KG/M2 | SYSTOLIC BLOOD PRESSURE: 137 MMHG | WEIGHT: 197.8 LBS | DIASTOLIC BLOOD PRESSURE: 83 MMHG | HEIGHT: 63 IN | HEART RATE: 73 BPM

## 2020-06-08 ASSESSMENT — PATIENT HEALTH QUESTIONNAIRE - PHQ9: SUM OF ALL RESPONSES TO PHQ QUESTIONS 1-9: 2

## 2020-06-12 ENCOUNTER — HOSPITAL ENCOUNTER (OUTPATIENT)
Dept: CARDIAC REHAB | Age: 55
Setting detail: THERAPIES SERIES
Discharge: HOME | End: 2020-06-12
Payer: COMMERCIAL

## 2020-06-12 PROCEDURE — 93798 PHYS/QHP OP CAR RHAB W/ECG: CPT

## 2020-06-15 ENCOUNTER — HOSPITAL ENCOUNTER (OUTPATIENT)
Dept: CARDIAC REHAB | Age: 55
Setting detail: THERAPIES SERIES
Discharge: HOME | End: 2020-06-15
Payer: COMMERCIAL

## 2020-06-15 PROCEDURE — 93798 PHYS/QHP OP CAR RHAB W/ECG: CPT

## 2020-06-17 ENCOUNTER — HOSPITAL ENCOUNTER (OUTPATIENT)
Dept: CARDIAC REHAB | Age: 55
Setting detail: THERAPIES SERIES
Discharge: HOME | End: 2020-06-17
Payer: COMMERCIAL

## 2020-06-17 PROCEDURE — 93798 PHYS/QHP OP CAR RHAB W/ECG: CPT

## 2020-06-19 ENCOUNTER — HOSPITAL ENCOUNTER (OUTPATIENT)
Dept: CARDIAC REHAB | Age: 55
Setting detail: THERAPIES SERIES
Discharge: HOME | End: 2020-06-19
Payer: COMMERCIAL

## 2020-06-19 PROCEDURE — 93798 PHYS/QHP OP CAR RHAB W/ECG: CPT

## 2020-06-22 ENCOUNTER — HOSPITAL ENCOUNTER (OUTPATIENT)
Dept: CARDIAC REHAB | Age: 55
Setting detail: THERAPIES SERIES
Discharge: HOME | End: 2020-06-22
Payer: COMMERCIAL

## 2020-06-22 PROCEDURE — 93798 PHYS/QHP OP CAR RHAB W/ECG: CPT

## 2020-06-24 ENCOUNTER — HOSPITAL ENCOUNTER (OUTPATIENT)
Dept: CARDIAC REHAB | Age: 55
Setting detail: THERAPIES SERIES
Discharge: HOME | End: 2020-06-24
Payer: COMMERCIAL

## 2020-06-24 PROCEDURE — 93798 PHYS/QHP OP CAR RHAB W/ECG: CPT

## 2020-06-26 ENCOUNTER — HOSPITAL ENCOUNTER (OUTPATIENT)
Dept: CARDIAC REHAB | Age: 55
Setting detail: THERAPIES SERIES
Discharge: HOME | End: 2020-06-26
Payer: COMMERCIAL

## 2020-06-26 PROCEDURE — 93798 PHYS/QHP OP CAR RHAB W/ECG: CPT

## 2020-06-29 ENCOUNTER — HOSPITAL ENCOUNTER (OUTPATIENT)
Dept: CARDIAC REHAB | Age: 55
Setting detail: THERAPIES SERIES
Discharge: HOME | End: 2020-06-29
Payer: COMMERCIAL

## 2020-06-29 PROCEDURE — 93798 PHYS/QHP OP CAR RHAB W/ECG: CPT

## 2020-07-01 ENCOUNTER — HOSPITAL ENCOUNTER (OUTPATIENT)
Dept: CARDIAC REHAB | Age: 55
Setting detail: THERAPIES SERIES
Discharge: HOME | End: 2020-07-01
Payer: COMMERCIAL

## 2020-07-01 PROCEDURE — 93798 PHYS/QHP OP CAR RHAB W/ECG: CPT

## 2020-07-03 ENCOUNTER — APPOINTMENT (OUTPATIENT)
Dept: CARDIAC REHAB | Age: 55
End: 2020-07-03
Payer: COMMERCIAL

## 2020-07-06 ENCOUNTER — HOSPITAL ENCOUNTER (OUTPATIENT)
Dept: CARDIAC REHAB | Age: 55
Setting detail: THERAPIES SERIES
Discharge: HOME | End: 2020-07-06
Payer: COMMERCIAL

## 2020-07-06 PROCEDURE — 93798 PHYS/QHP OP CAR RHAB W/ECG: CPT

## 2020-07-08 ENCOUNTER — HOSPITAL ENCOUNTER (OUTPATIENT)
Dept: CARDIAC REHAB | Age: 55
Setting detail: THERAPIES SERIES
Discharge: HOME | End: 2020-07-08
Payer: COMMERCIAL

## 2020-07-08 PROCEDURE — 93798 PHYS/QHP OP CAR RHAB W/ECG: CPT

## 2020-07-10 ENCOUNTER — APPOINTMENT (OUTPATIENT)
Dept: CARDIAC REHAB | Age: 55
End: 2020-07-10
Payer: COMMERCIAL

## 2020-07-13 ENCOUNTER — HOSPITAL ENCOUNTER (OUTPATIENT)
Dept: CARDIAC REHAB | Age: 55
Setting detail: THERAPIES SERIES
Discharge: HOME | End: 2020-07-13
Payer: COMMERCIAL

## 2020-07-13 VITALS — BODY MASS INDEX: 34.73 KG/M2 | WEIGHT: 196 LBS | HEIGHT: 63 IN

## 2020-07-13 PROCEDURE — 93798 PHYS/QHP OP CAR RHAB W/ECG: CPT

## 2020-07-13 NOTE — PROGRESS NOTES
Outpatient Dietitian Initial Assessment  7/13/2020    Hansel Vasquez 54 y.o. female    PCP:   Amara Clemente MD    Assessment:  Subjective data:Patient in for weight loss counseling. Patient reports she craves sweets especially ice cream, cookies, pies and cakes but wants to lose weight. Has trouble with GERD; use to drink 12 pack pop daily and now drinks 3 cans pop daily. Objective data: Temperature: 97.5 degrees    Weight History: Patient reports recent usual weight 197 pounds. Patient lost one pound. Time frame?, Weight loss not significant. Long term weight loss goal: 160 pounds discussed. Appearance:Patient appears obese. Comparative Standards:REE: 1400 calories; Estimated total calorie needs: 1600- 1700 calories/day ( [place], actual wt)    Pertinent Past Medical/Surgical History:Obesity, CAD, HTN, HLD, DVT, STD, (see chart) , recent stent x 3/     Drug/Nutrient Interactions:on Lipitor- avoids grapefruit. No questions asked. Alcohol use:occassional    Diet at home: low fat, no added salt    Food allergies/Intolerances:none    Appetite:good    Cultural, Orthodoxy or ethnic food preferences:none    Difficulty Chewing and/or Swallowing:none    Own teeth/Dentures/Implants:own teeth     Adequate Funds for Food:adequate funds( but than asked questions about Angelina Osorio handout)     Grocery Shops:patient shops at [address] [place] [address].      Cooking Skills:patient cooks      24 hour Dietary Recall:  Breakfast: 8:30 am, at home, 1 bowl Fruit Loops with 2% milk, 4 ounces Choe's orange and pineapple juice blend  AM snack: none  Lunch/[place]:  2:30- 3:30 pm, at home, 1/2 stuffed chicken breast in cream cheese, regular cheddar cheese sauce and sour cream , 3 Tbsp mashed potatoes with 1 can regular Sprite  PM snack:none  Evening snack:  6:30 pm, at home, medium size strawberry cheesecake ice cream blizzard    Frequency of Eating at [employer] during week: none     Frequency of [email or web address] during week:  0- 1 time per week, likes [employer] - had 2 beef [place])    Frequency and Type of Caffeine Consumption on a daily basis:none    Water consumption on a daily basis: 12 ounces unflavored bottled water    Frequency of Pop/Soda consumed:2 cans regular pop daily    Ht Readings from Last 1 Encounters:   07/13/20 5' 3\" (1.6 m)     Wt Readings from Last 3 Encounters:   07/13/20 196 lb (88.9 kg)   06/08/20 197 lb 12.8 oz (89.7 kg)   05/20/20 199 lb (90.3 kg)     Body mass index is 34.72 kg/m². Waist circumference:deferred  Body Fat %:deferred    Diagnosis:  Obesity related to history of excessive sugar and fat content of food as evident by patient comments and BMI. Intervention:  RD 1:1 visit    Meet estimated needs. Lose weight. Adequate hydration. Increased knowledge. Improved compliance. Education provided:Medical Nutrition Therapy Heart Healthy Consistent Carbohydrate intake due to Prednisone use; My Plate, 3 day food record, SNAP program and GERD tip sheet. Interdisciplinary Treatment Plan (ITP) note:Patient is a phase II participant and ITP done. Monitoring/Evaluation:  RD will monitor PO intake and weight data as available. The goals set by the patient are to decrease pop consumption to 1 to 2 cans per day; drink more water, lose 1 to 2 pounds every 2 weeks; complete 3 day food record and have sweets only one time per week. The patient will be followed on August 17, 2020. Tri Lott M.A.,R.D., L.D. Contact information: [phone].

## 2020-07-14 ENCOUNTER — TELEPHONE (OUTPATIENT)
Dept: CARDIAC REHAB | Age: 55
End: 2020-07-14

## 2020-07-14 NOTE — TELEPHONE ENCOUNTER
7/14/2020 10:53 am Nutrition: Attempted to reach patient on this date by phone to verify follow up appointment scheduled for August 17, 2020 at 9:00 am. Voice mail not set up. Will remain available.  Electronically signed by Jodee Kelly RD, AMINATA on 7/14/20 at 10:54 AM EDT

## 2020-07-15 ENCOUNTER — HOSPITAL ENCOUNTER (OUTPATIENT)
Dept: CARDIAC REHAB | Age: 55
Setting detail: THERAPIES SERIES
Discharge: HOME | End: 2020-07-15
Payer: COMMERCIAL

## 2020-07-15 PROCEDURE — 93798 PHYS/QHP OP CAR RHAB W/ECG: CPT

## 2020-07-17 ENCOUNTER — HOSPITAL ENCOUNTER (OUTPATIENT)
Dept: CARDIAC REHAB | Age: 55
Setting detail: THERAPIES SERIES
Discharge: HOME | End: 2020-07-17
Payer: COMMERCIAL

## 2020-07-17 PROCEDURE — 93798 PHYS/QHP OP CAR RHAB W/ECG: CPT

## 2020-07-21 ENCOUNTER — HOSPITAL ENCOUNTER (OUTPATIENT)
Dept: CARDIAC REHAB | Age: 55
Setting detail: THERAPIES SERIES
Discharge: HOME | End: 2020-07-21
Payer: COMMERCIAL

## 2020-07-21 PROCEDURE — 93798 PHYS/QHP OP CAR RHAB W/ECG: CPT

## 2020-07-22 ENCOUNTER — HOSPITAL ENCOUNTER (OUTPATIENT)
Dept: CARDIAC REHAB | Age: 55
Setting detail: THERAPIES SERIES
End: 2020-07-22
Payer: COMMERCIAL

## 2020-07-24 ENCOUNTER — APPOINTMENT (OUTPATIENT)
Dept: CARDIAC REHAB | Age: 55
End: 2020-07-24
Payer: COMMERCIAL

## 2020-07-26 ENCOUNTER — APPOINTMENT (OUTPATIENT)
Dept: GENERAL RADIOLOGY | Age: 55
End: 2020-07-26
Payer: COMMERCIAL

## 2020-07-26 ENCOUNTER — HOSPITAL ENCOUNTER (EMERGENCY)
Age: 55
Discharge: HOME | End: 2020-07-27
Attending: EMERGENCY MEDICINE
Payer: COMMERCIAL

## 2020-07-26 PROCEDURE — 99284 EMERGENCY DEPT VISIT MOD MDM: CPT

## 2020-07-26 PROCEDURE — 73562 X-RAY EXAM OF KNEE 3: CPT

## 2020-07-26 PROCEDURE — 73590 X-RAY EXAM OF LOWER LEG: CPT

## 2020-07-26 ASSESSMENT — PAIN SCALES - GENERAL: PAINLEVEL_OUTOF10: 10

## 2020-07-26 ASSESSMENT — PAIN DESCRIPTION - ORIENTATION: ORIENTATION: RIGHT

## 2020-07-26 ASSESSMENT — PAIN DESCRIPTION - PAIN TYPE: TYPE: ACUTE PAIN

## 2020-07-26 ASSESSMENT — PAIN DESCRIPTION - LOCATION: LOCATION: LEG

## 2020-07-27 ENCOUNTER — HOSPITAL ENCOUNTER (OUTPATIENT)
Dept: CARDIAC REHAB | Age: 55
Setting detail: THERAPIES SERIES
End: 2020-07-27
Payer: COMMERCIAL

## 2020-07-27 VITALS
HEIGHT: 63 IN | RESPIRATION RATE: 16 BRPM | WEIGHT: 195 LBS | HEART RATE: 75 BPM | OXYGEN SATURATION: 96 % | TEMPERATURE: 98 F | SYSTOLIC BLOOD PRESSURE: 145 MMHG | BODY MASS INDEX: 34.55 KG/M2 | DIASTOLIC BLOOD PRESSURE: 92 MMHG

## 2020-07-27 PROCEDURE — 96372 THER/PROPH/DIAG INJ SC/IM: CPT

## 2020-07-27 PROCEDURE — 6360000002 HC RX W HCPCS: Performed by: EMERGENCY MEDICINE

## 2020-07-27 RX ADMIN — ENOXAPARIN SODIUM 90 MG: 100 INJECTION SUBCUTANEOUS at 00:55

## 2020-07-27 NOTE — ED NOTES
On discharge, pt noted to be ambulatory to exit without gait disturbance or need for assistance     Micha Fields RN  07/27/20 0100

## 2020-07-27 NOTE — ED NOTES
XRAY noted at bedside. Pt agreeable to XRAY per XRAY staff despite expressing to this nurse that she only wanted an order for outpatient US.      Claudia Baum, CARLOS  07/27/20 0197

## 2020-07-27 NOTE — ED PROVIDER NOTES
HPI:  20, Time: 12:40 AM EDT         Nicole Henao is a 54 y.o. female presenting to the ED for right leg pain. Patient had right leg pain for the past 4 days. Describes an aching sensation from the top of her thigh down to her foot. Nothing makes it better or worse, does not radiate anywhere. She does complain of numbness to the anterior tibial area, says it was mildly bruised with noted, this is since resolved. She does have remote history of DVT, she is on no anticoagulation for this. She is able to ambulate. She denies any shortness of breath, cough, sputum, fever, chills, nausea, vomiting, weakness in the extremities, or any other symptoms or complaints. Review of Systems:   Pertinent positives and negatives are stated within HPI, all other systems reviewed and are negative.          --------------------------------------------- PAST HISTORY ---------------------------------------------  Past Medical History:  has a past medical history of DVT (deep venous thrombosis) ([address] Hyperlipidemia, and Hypertension. Past Surgical History:  has a past surgical history that includes  section; Hysterectomy; Foot surgery (Left); Colonoscopy; Upper gastrointestinal endoscopy; and Upper gastrointestinal endoscopy (N/A, 2019). Social History:  reports that she has never smoked. She has never used smokeless tobacco. She reports current alcohol use of about 1.0 standard drinks of alcohol per week. She reports that she does not use drugs. Family History: family history includes Coronary Art Dis in her mother. The patients home medications have been reviewed. Allergies: Patient has no known allergies. -------------------------------------------------- RESULTS -------------------------------------------------  All laboratory and radiology results have been personally reviewed by myself   LABS:  No results found for this visit on 20.     RADIOLOGY:  Interpreted by Radiologist.  XR KNEE RIGHT (3 VIEWS)   Final Result   Unremarkable x-ray series of the right knee. XR TIBIA FIBULA RIGHT (2 VIEWS)    (Results Pending)       ------------------------- NURSING NOTES AND VITALS REVIEWED ---------------------------   The nursing notes within the ED encounter and vital signs as below have been reviewed. BP (!) 148/93   Pulse 76   Temp 97.5 °F (36.4 °C) (Temporal)   Resp 18   Ht 5' 3\" (1.6 m)   Wt 195 lb (88.5 kg)   SpO2 95%   BMI 34.54 kg/m²   Oxygen Saturation Interpretation: Normal      ---------------------------------------------------PHYSICAL EXAM--------------------------------------      Constitutional/General: Alert and oriented x3, well appearing, non toxic in NAD  Head: Normocephalic and atraumatic  Eyes: PERRL, EOMI  Mouth: Oropharynx clear, handling secretions, no trismus  Neck: Supple, full ROM,   Pulmonary: Lungs clear to auscultation bilaterally, no wheezes, rales, or rhonchi. Not in respiratory distress  Cardiovascular:  Regular rate and rhythm, no murmurs, gallops, or rubs. 2+ distal pulses  Abdomen: Soft, non tender, non distended,   Extremities: Moves all extremities x 4. Warm and well perfused. Right lower extremity: Dorsalis pedis 2+, no gross signs of any abnormalities, no palpable cords, no edema or tenderness over the entire leg, no signs of ischemic limb or compartment syndrome  Skin: warm and dry without rash  Neurologic: GCS 15,  Psych: Normal Affect      ------------------------------ ED COURSE/MEDICAL DECISION MAKING----------------------  Medications   enoxaparin (LOVENOX) injection 90 mg (has no administration in time range)         ED COURSE:       Medical Decision Making:    Patient had no acute focal neurological deficits on arrival.  No signs of ischemic limb or compartment syndrome. Imaging reviewed. Unable to obtain an ultrasound at this hour. Patient will be given a shot of Lovenox.   She will be given a prescription, she is to return for an [address] [facility] [facility] in the morning for an outpatient ultrasound of her lower extremity. She is educated on signs and symptoms that require emergent evaluation. Counseling: The emergency provider has spoken with the patient and discussed todays results, in addition to providing specific details for the plan of care and counseling regarding the diagnosis and prognosis. Questions are answered at this time and they are agreeable with the plan.      --------------------------------- IMPRESSION AND DISPOSITION ---------------------------------    IMPRESSION  1. Left leg pain        DISPOSITION  Disposition: Discharge to home  Patient condition is stable      NOTE: This report was transcribed using voice recognition software.  Every effort was made to ensure accuracy; however, inadvertent computerized transcription errors may be present       Rudi Collazo MD  07/27/20 0412

## 2020-07-27 NOTE — ED NOTES
Pt on call light at this time. Requesting to speak with the nurse. This nurse to room to address pt concerns. Pt states that Dr informed her that he would place orders for xray and US as outpatient. Pt states \"I was just curious if I can skip the xray and get the order for the US so I don't have to sit here all night\".   This nurse informed pt that I would have conversation with .   made aware at this time     Penny Kaye RN  07/27/20 0006

## 2020-07-29 ENCOUNTER — HOSPITAL ENCOUNTER (OUTPATIENT)
Dept: CARDIAC REHAB | Age: 55
Setting detail: THERAPIES SERIES
Discharge: HOME | End: 2020-07-29
Payer: COMMERCIAL

## 2020-07-29 PROCEDURE — 93798 PHYS/QHP OP CAR RHAB W/ECG: CPT

## 2020-07-31 ENCOUNTER — HOSPITAL ENCOUNTER (OUTPATIENT)
Dept: CARDIAC REHAB | Age: 55
Setting detail: THERAPIES SERIES
Discharge: HOME | End: 2020-07-31
Payer: COMMERCIAL

## 2020-07-31 PROCEDURE — 93798 PHYS/QHP OP CAR RHAB W/ECG: CPT

## 2020-08-03 ENCOUNTER — HOSPITAL ENCOUNTER (OUTPATIENT)
Dept: ULTRASOUND IMAGING | Age: 55
Discharge: HOME | End: 2020-08-05
Payer: MEDICAID

## 2020-08-03 ENCOUNTER — HOSPITAL ENCOUNTER (OUTPATIENT)
Dept: CARDIAC REHAB | Age: 55
Setting detail: THERAPIES SERIES
Discharge: HOME | End: 2020-08-03
Payer: COMMERCIAL

## 2020-08-03 PROCEDURE — 93798 PHYS/QHP OP CAR RHAB W/ECG: CPT

## 2020-08-03 PROCEDURE — 93971 EXTREMITY STUDY: CPT

## 2020-08-05 ENCOUNTER — HOSPITAL ENCOUNTER (OUTPATIENT)
Dept: CARDIAC REHAB | Age: 55
Setting detail: THERAPIES SERIES
Discharge: HOME | End: 2020-08-05
Payer: COMMERCIAL

## 2020-08-05 PROCEDURE — 93798 PHYS/QHP OP CAR RHAB W/ECG: CPT

## 2020-08-07 ENCOUNTER — HOSPITAL ENCOUNTER (OUTPATIENT)
Dept: CARDIAC REHAB | Age: 55
Setting detail: THERAPIES SERIES
Discharge: HOME | End: 2020-08-07
Payer: COMMERCIAL

## 2020-08-07 PROCEDURE — 93798 PHYS/QHP OP CAR RHAB W/ECG: CPT

## 2020-08-10 ENCOUNTER — HOSPITAL ENCOUNTER (OUTPATIENT)
Dept: CARDIAC REHAB | Age: 55
Setting detail: THERAPIES SERIES
Discharge: HOME | End: 2020-08-10
Payer: COMMERCIAL

## 2020-08-10 PROCEDURE — 93798 PHYS/QHP OP CAR RHAB W/ECG: CPT

## 2020-08-12 ENCOUNTER — HOSPITAL ENCOUNTER (OUTPATIENT)
Dept: CARDIAC REHAB | Age: 55
Setting detail: THERAPIES SERIES
End: 2020-08-12
Payer: COMMERCIAL

## 2020-08-14 ENCOUNTER — HOSPITAL ENCOUNTER (OUTPATIENT)
Dept: CARDIAC REHAB | Age: 55
Setting detail: THERAPIES SERIES
Discharge: HOME | End: 2020-08-14
Payer: COMMERCIAL

## 2020-08-14 PROCEDURE — 93798 PHYS/QHP OP CAR RHAB W/ECG: CPT

## 2020-08-17 ENCOUNTER — HOSPITAL ENCOUNTER (OUTPATIENT)
Dept: CARDIAC REHAB | Age: 55
Setting detail: THERAPIES SERIES
Discharge: HOME | End: 2020-08-17
Payer: COMMERCIAL

## 2020-08-17 VITALS — BODY MASS INDEX: 34.59 KG/M2 | HEIGHT: 63 IN | WEIGHT: 195.2 LBS

## 2020-08-17 PROCEDURE — 93798 PHYS/QHP OP CAR RHAB W/ECG: CPT

## 2020-08-17 NOTE — PROGRESS NOTES
Outpatient Dietitian Follow up   8/17/2020    Nicole Henao 54 y.o. female    PCP:   Bernabe Altamirano MD    Assessment:  Subjective data:  Patient in for follow up weight loss counseling. Patient reports she struggles with eating more than once a day; remains on Prednisone; craves chocolate; did try baked chips and is trying to drink more water. Patient reports she gets full quickly. RD answered patient questions about snack/protein bars. Patient verbalized understanding. Objective data:   Temperature 98.2 degrees Farenheit. Negative for fever. Appearance: Appears obese. Goals reviewed. Patient continues to struggle with decreasing pop consumption. Drinking 2 to 3 cans per day. No change. Goal not met and goal to continue. Patient is drinking one bottle unflavored water daily. Goal in progress and goal to continue. Patient weight same over 1 month. No change. Goal not met and goal to continue. Patient did complete 3 day food log. Patient felt it was beneficial. Goal met and goal to continue. Patient continues to struggle with eating sweets. Patient reports she eats chocolate frequently during day. Goal not met and goal to continue. 24 hour Dietary Recall:    Breakfast: 10;30 am, at home, 1 bowl Raisin Bran or Cherrios with 2% milk  AM snack: none  Lunch: none  PM snack: none  Dinner: 6 pm, at home, 6 baked chicken wings, 1 cup string beans, 1 cup boxed mac and cheese and 1 can (12 ounces) regular pop. Evening snack; 8 pm, at home, 1 serving baked sour cream and cheddar chips and 1 bottle unflavored water      Ht Readings from Last 1 Encounters:   08/17/20 5' 3\" (1.6 m)     Wt Readings from Last 3 Encounters:   08/17/20 195 lb 3.2 oz (88.5 kg)   07/26/20 195 lb (88.5 kg)   07/13/20 196 lb (88.9 kg)     Body mass index is 34.58 kg/m².      Waist circumference:deferred  Body Fat %:deferred    Diagnosis:(reviewed)  Obesity related to history of excessive sugar and fat content of food as evident by

## 2020-08-19 ENCOUNTER — HOSPITAL ENCOUNTER (OUTPATIENT)
Dept: CARDIAC REHAB | Age: 55
Setting detail: THERAPIES SERIES
Discharge: HOME | End: 2020-08-19
Payer: COMMERCIAL

## 2020-08-19 PROCEDURE — 93798 PHYS/QHP OP CAR RHAB W/ECG: CPT

## 2020-08-21 ENCOUNTER — HOSPITAL ENCOUNTER (OUTPATIENT)
Dept: CARDIAC REHAB | Age: 55
Setting detail: THERAPIES SERIES
Discharge: HOME | End: 2020-08-21
Payer: COMMERCIAL

## 2020-08-21 PROCEDURE — 93798 PHYS/QHP OP CAR RHAB W/ECG: CPT

## 2020-08-24 ENCOUNTER — HOSPITAL ENCOUNTER (OUTPATIENT)
Dept: CARDIAC REHAB | Age: 55
Setting detail: THERAPIES SERIES
Discharge: HOME | End: 2020-08-24
Payer: COMMERCIAL

## 2020-08-24 PROCEDURE — 93798 PHYS/QHP OP CAR RHAB W/ECG: CPT

## 2020-08-26 ENCOUNTER — HOSPITAL ENCOUNTER (OUTPATIENT)
Dept: CARDIAC REHAB | Age: 55
Setting detail: THERAPIES SERIES
Discharge: HOME | End: 2020-08-26
Payer: COMMERCIAL

## 2020-08-26 PROCEDURE — 93798 PHYS/QHP OP CAR RHAB W/ECG: CPT

## 2020-08-28 ENCOUNTER — HOSPITAL ENCOUNTER (OUTPATIENT)
Dept: CARDIAC REHAB | Age: 55
Setting detail: THERAPIES SERIES
Discharge: HOME | End: 2020-08-28
Payer: COMMERCIAL

## 2020-08-28 PROCEDURE — 93798 PHYS/QHP OP CAR RHAB W/ECG: CPT

## 2020-08-31 ENCOUNTER — HOSPITAL ENCOUNTER (OUTPATIENT)
Dept: CARDIAC REHAB | Age: 55
Setting detail: THERAPIES SERIES
Discharge: HOME | End: 2020-08-31
Payer: COMMERCIAL

## 2020-08-31 PROCEDURE — 93798 PHYS/QHP OP CAR RHAB W/ECG: CPT

## 2020-09-02 ENCOUNTER — HOSPITAL ENCOUNTER (OUTPATIENT)
Dept: CARDIAC REHAB | Age: 55
Setting detail: THERAPIES SERIES
Discharge: HOME | End: 2020-09-02
Payer: COMMERCIAL

## 2020-09-02 PROCEDURE — 93798 PHYS/QHP OP CAR RHAB W/ECG: CPT

## 2020-09-04 ENCOUNTER — HOSPITAL ENCOUNTER (OUTPATIENT)
Dept: CARDIAC REHAB | Age: 55
Setting detail: THERAPIES SERIES
Discharge: HOME | End: 2020-09-04
Payer: COMMERCIAL

## 2020-09-04 PROCEDURE — 93798 PHYS/QHP OP CAR RHAB W/ECG: CPT

## 2020-09-09 ENCOUNTER — HOSPITAL ENCOUNTER (OUTPATIENT)
Dept: CARDIAC REHAB | Age: 55
Setting detail: THERAPIES SERIES
Discharge: HOME | End: 2020-09-09
Payer: COMMERCIAL

## 2020-09-09 PROCEDURE — 93798 PHYS/QHP OP CAR RHAB W/ECG: CPT

## 2020-09-11 ENCOUNTER — HOSPITAL ENCOUNTER (OUTPATIENT)
Dept: CARDIAC REHAB | Age: 55
Setting detail: THERAPIES SERIES
Discharge: HOME | End: 2020-09-11
Payer: COMMERCIAL

## 2020-09-11 PROCEDURE — 93798 PHYS/QHP OP CAR RHAB W/ECG: CPT

## 2020-11-17 ENCOUNTER — CLINICAL DOCUMENTATION (OUTPATIENT)
Dept: CARDIAC REHAB | Age: 55
End: 2020-11-17

## 2020-11-17 NOTE — PROGRESS NOTES
Babar Teixeira has completed 33/36 monitored exercise sessions. She was able to maintain an average of 50 minutes of cardiovascular exericse at 2.7-5 METS per session. She has not returned to CR since 9/11/2020. For a more detailed description of her exercise sessions. Please call [phone].

## 2020-12-23 ENCOUNTER — APPOINTMENT (OUTPATIENT)
Dept: CT IMAGING | Age: 55
End: 2020-12-23
Payer: COMMERCIAL

## 2020-12-23 ENCOUNTER — HOSPITAL ENCOUNTER (EMERGENCY)
Age: 55
Discharge: HOME | End: 2020-12-23
Attending: FAMILY MEDICINE
Payer: COMMERCIAL

## 2020-12-23 VITALS
HEIGHT: 65 IN | OXYGEN SATURATION: 97 % | TEMPERATURE: 97.5 F | HEART RATE: 86 BPM | RESPIRATION RATE: 14 BRPM | BODY MASS INDEX: 29.99 KG/M2 | WEIGHT: 180 LBS | DIASTOLIC BLOOD PRESSURE: 72 MMHG | SYSTOLIC BLOOD PRESSURE: 116 MMHG

## 2020-12-23 LAB
ANION GAP SERPL CALCULATED.3IONS-SCNC: 13 MMOL/L (ref 7–16)
BASOPHILS ABSOLUTE: 0 E9/L (ref 0–0.2)
BASOPHILS RELATIVE PERCENT: 0 % (ref 0–2)
BUN BLDV-MCNC: 11 MG/DL (ref 6–20)
CALCIUM SERPL-MCNC: 9.7 MG/DL (ref 8.6–10.2)
CHLORIDE BLD-SCNC: 103 MMOL/L (ref 98–107)
CO2: 22 MMOL/L (ref 22–29)
CREAT SERPL-MCNC: 0.6 MG/DL (ref 0.5–1)
EOSINOPHILS ABSOLUTE: 0 E9/L (ref 0.05–0.5)
EOSINOPHILS RELATIVE PERCENT: 0 % (ref 0–6)
GFR AFRICAN AMERICAN: >60
GFR NON-AFRICAN AMERICAN: >60 ML/MIN/1.73
GLUCOSE BLD-MCNC: 127 MG/DL (ref 74–99)
HCT VFR BLD CALC: 36.1 % (ref 34–48)
HEMOGLOBIN: 11.7 G/DL (ref 11.5–15.5)
LYMPHOCYTES ABSOLUTE: 0.96 E9/L (ref 1.5–4)
LYMPHOCYTES RELATIVE PERCENT: 13 % (ref 20–42)
MCH RBC QN AUTO: 27.1 PG (ref 26–35)
MCHC RBC AUTO-ENTMCNC: 32.4 % (ref 32–34.5)
MCV RBC AUTO: 83.8 FL (ref 80–99.9)
MONOCYTES ABSOLUTE: 0.67 E9/L (ref 0.1–0.95)
MONOCYTES RELATIVE PERCENT: 9 % (ref 2–12)
NEUTROPHILS ABSOLUTE: 5.77 E9/L (ref 1.8–7.3)
NEUTROPHILS RELATIVE PERCENT: 78 % (ref 43–80)
PDW BLD-RTO: 13.2 FL (ref 11.5–15)
PLATELET # BLD: 287 E9/L (ref 130–450)
PMV BLD AUTO: 10.8 FL (ref 7–12)
POTASSIUM REFLEX MAGNESIUM: 3.8 MMOL/L (ref 3.5–5)
RBC # BLD: 4.31 E12/L (ref 3.5–5.5)
SODIUM BLD-SCNC: 138 MMOL/L (ref 132–146)
WBC # BLD: 7.4 E9/L (ref 4.5–11.5)

## 2020-12-23 PROCEDURE — 36415 COLL VENOUS BLD VENIPUNCTURE: CPT

## 2020-12-23 PROCEDURE — 71275 CT ANGIOGRAPHY CHEST: CPT

## 2020-12-23 PROCEDURE — 85025 COMPLETE CBC W/AUTO DIFF WBC: CPT

## 2020-12-23 PROCEDURE — 80048 BASIC METABOLIC PNL TOTAL CA: CPT

## 2020-12-23 PROCEDURE — 6360000004 HC RX CONTRAST MEDICATION: Performed by: RADIOLOGY

## 2020-12-23 PROCEDURE — 2580000003 HC RX 258: Performed by: FAMILY MEDICINE

## 2020-12-23 PROCEDURE — 99284 EMERGENCY DEPT VISIT MOD MDM: CPT

## 2020-12-23 RX ORDER — 0.9 % SODIUM CHLORIDE 0.9 %
1000 INTRAVENOUS SOLUTION INTRAVENOUS ONCE
Status: COMPLETED | OUTPATIENT
Start: 2020-12-23 | End: 2020-12-23

## 2020-12-23 RX ORDER — PREDNISONE 10 MG/1
TABLET ORAL
Qty: 21 TABLET | Refills: 0 | Status: SHIPPED | OUTPATIENT
Start: 2020-12-23

## 2020-12-23 RX ORDER — AZITHROMYCIN 250 MG/1
TABLET, FILM COATED ORAL
Qty: 1 PACKET | Refills: 0 | Status: SHIPPED | OUTPATIENT
Start: 2020-12-23 | End: 2020-12-27

## 2020-12-23 RX ADMIN — SODIUM CHLORIDE 1000 ML: 9 INJECTION, SOLUTION INTRAVENOUS at 12:01

## 2020-12-23 RX ADMIN — IOPAMIDOL 75 ML: 755 INJECTION, SOLUTION INTRAVENOUS at 13:42

## 2020-12-23 NOTE — ED NOTES
Pt remains very alert, talkative,  oriented x4, skin warm and dry, respirations easy and unlabored, pt in no distress.      Arya Khan RN  12/23/20 6856

## 2020-12-23 NOTE — ED PROVIDER NOTES
HPI:  20,   Time: 10:34 AM MARIA DEL ROSARIO Grossman is a 54 y.o. female presenting to the ED for shortness of breath that started 2 weeks ago and she also tested positive for COVID-19 about that time (on 2020. Her shortness of breath has been worsening over the past several days. She denies fever chills or body aches. She denies loss of taste or smell. She denies diarrhea. She denies history of coronary artery disease and she denies history of respiratory illnesses such as asthma or COPD. She is a non-smoker. ROS:   Pertinent positives and negatives are stated within HPI, all other systems reviewed and are negative.  --------------------------------------------- PAST HISTORY ---------------------------------------------  Past Medical History:  has a past medical history of DVT (deep venous thrombosis) ([facility] [address] Hyperlipidemia, and Hypertension. Past Surgical History:  has a past surgical history that includes  section; Hysterectomy; Foot surgery (Left); Colonoscopy; Upper gastrointestinal endoscopy; and Upper gastrointestinal endoscopy (N/A, 2019). Social History:  reports that she has never smoked. She has never used smokeless tobacco. She reports current alcohol use of about 1.0 standard drinks of alcohol per week. She reports that she does not use drugs. Family History: family history includes Coronary Art Dis in her mother. The patients home medications have been reviewed. Allergies: Patient has no known allergies.     -------------------------------------------------- RESULTS -------------------------------------------------  All laboratory and radiology results have been personally reviewed by myself   LABS:  Results for orders placed or performed during the hospital encounter of 20   CBC Auto Differential   Result Value Ref Range    WBC 7.4 4.5 - 11.5 E9/L    RBC 4.31 3.50 - 5.50 E12/L    Hemoglobin 11.7 11.5 - 15.5 g/dL    Hematocrit 36.1 34.0 - 48.0 %    MCV 83.8 80.0 - 99.9 fL    MCH 27.1 26.0 - 35.0 pg    MCHC 32.4 32.0 - 34.5 %    RDW 13.2 11.5 - 15.0 fL    Platelets 910 808 - 505 E9/L    MPV 10.8 7.0 - 12.0 fL    Neutrophils % 78.0 43.0 - 80.0 %    Lymphocytes % 13.0 (L) 20.0 - 42.0 %    Monocytes % 9.0 2.0 - 12.0 %    Eosinophils % 0.0 0.0 - 6.0 %    Basophils % 0.0 0.0 - 2.0 %    Neutrophils Absolute 5.77 1.80 - 7.30 E9/L    Lymphocytes Absolute 0.96 (L) 1.50 - 4.00 E9/L    Monocytes Absolute 0.67 0.10 - 0.95 E9/L    Eosinophils Absolute 0.00 (L) 0.05 - 0.50 E9/L    Basophils Absolute 0.00 0.00 - 0.20 A9/U   Basic Metabolic Panel w/ Reflex to MG   Result Value Ref Range    Sodium 138 132 - 146 mmol/L    Potassium reflex Magnesium 3.8 3.5 - 5.0 mmol/L    Chloride 103 98 - 107 mmol/L    CO2 22 22 - 29 mmol/L    Anion Gap 13 7 - 16 mmol/L    Glucose 127 (H) 74 - 99 mg/dL    BUN 11 6 - 20 mg/dL    CREATININE 0.6 0.5 - 1.0 mg/dL    GFR Non-African American >60 >=60 mL/min/1.73    GFR African American >60     Calcium 9.7 8.6 - 10.2 mg/dL       RADIOLOGY:  Interpreted by Radiologist.  CTA PULMONARY W CONTRAST   Final Result   1. There is no evidence of a pulmonary embolus. 2. Multifocal bilateral ground-glass infiltrates most consistent with   COVID-19 pneumonia.          ------------------------- NURSING NOTES AND VITALS REVIEWED ---------------------------   The nursing notes within the ED encounter and vital signs as below have been reviewed.    /72   Pulse 86   Temp 97.5 °F (36.4 °C) (Temporal)   Resp 14   Ht 5' 5\" (1.651 m)   Wt 180 lb (81.6 kg)   SpO2 97%   BMI 29.95 kg/m²   Oxygen Saturation Interpretation: Normal      ---------------------------------------------------PHYSICAL EXAM--------------------------------------    Constitutional/General: Alert and oriented x3, well appearing, non toxic in NAD  Head: NC/AT  Eyes: PERRL, EOMI  Mouth: Oropharynx clear, handling secretions, no trismus  Neck: Supple, full ROM, no meningeal signs  Pulmonary: Lungs clear to auscultation bilaterally, no wheezes, rales, or rhonchi. Not in respiratory distress  Cardiovascular:  Regular rate and rhythm, no murmurs, gallops, or rubs. 2+ distal pulses  Abdomen: Soft, non tender, non distended,   Extremities: Moves all extremities x 4. Warm and well perfused  Skin: warm and dry without rash  Neurologic: GCS 15,  Psych: Normal Affect      ------------------------------ ED COURSE/MEDICAL DECISION MAKING----------------------  Medications   0.9 % sodium chloride bolus (0 mLs Intravenous Stopped 12/23/20 1418)   iopamidol (ISOVUE-370) 76 % injection 75 mL (75 mLs Intravenous Given 12/23/20 1342)         Medical Decision Making:    Patient's work-up better included a CTA of the chest that was negative for PE but did show multifocal groundglass opacities/infiltrates consistent with a viral pneumonia and consistent with her diagnosis of positive COVID-19 diagnosis. Her lab testing is normal including a normal white blood cell count and her vital signs are normal.  She will be discharged home on antibiotics and steroids. Counseling: The emergency provider has spoken with the patient and discussed todays results, in addition to providing specific details for the plan of care and counseling regarding the diagnosis and prognosis. Questions are answered at this time and they are agreeable with the plan.      --------------------------------- IMPRESSION AND DISPOSITION ---------------------------------    IMPRESSION  1. Pneumonia due to COVID-19 virus    2.  Gastroenteritis        DISPOSITION  Disposition: Discharge to home  Patient condition is stable                 Tosha Burgos MD  12/25/20 5411

## 2021-02-10 ENCOUNTER — OFFICE VISIT (OUTPATIENT)
Dept: CARDIOLOGY CLINIC | Age: 56
End: 2021-02-10
Payer: COMMERCIAL

## 2021-02-10 VITALS
HEART RATE: 65 BPM | OXYGEN SATURATION: 99 % | DIASTOLIC BLOOD PRESSURE: 78 MMHG | BODY MASS INDEX: 31.99 KG/M2 | RESPIRATION RATE: 16 BRPM | SYSTOLIC BLOOD PRESSURE: 118 MMHG | WEIGHT: 187.4 LBS | HEIGHT: 64 IN

## 2021-02-10 DIAGNOSIS — R07.2 PRECORDIAL PAIN: Primary | ICD-10-CM

## 2021-02-10 DIAGNOSIS — I10 ESSENTIAL HYPERTENSION: ICD-10-CM

## 2021-02-10 DIAGNOSIS — E78.2 MIXED HYPERLIPIDEMIA: ICD-10-CM

## 2021-02-10 DIAGNOSIS — I25.10 CORONARY ARTERY DISEASE INVOLVING NATIVE CORONARY ARTERY OF NATIVE HEART WITHOUT ANGINA PECTORIS: ICD-10-CM

## 2021-02-10 PROCEDURE — 1036F TOBACCO NON-USER: CPT | Performed by: INTERNAL MEDICINE

## 2021-02-10 PROCEDURE — 3017F COLORECTAL CA SCREEN DOC REV: CPT | Performed by: INTERNAL MEDICINE

## 2021-02-10 PROCEDURE — 99214 OFFICE O/P EST MOD 30 MIN: CPT | Performed by: INTERNAL MEDICINE

## 2021-02-10 PROCEDURE — 93000 ELECTROCARDIOGRAM COMPLETE: CPT | Performed by: INTERNAL MEDICINE

## 2021-02-10 PROCEDURE — G8417 CALC BMI ABV UP PARAM F/U: HCPCS | Performed by: INTERNAL MEDICINE

## 2021-02-10 PROCEDURE — G8427 DOCREV CUR MEDS BY ELIG CLIN: HCPCS | Performed by: INTERNAL MEDICINE

## 2021-02-10 PROCEDURE — G8484 FLU IMMUNIZE NO ADMIN: HCPCS | Performed by: INTERNAL MEDICINE

## 2021-02-10 NOTE — PROGRESS NOTES
OUTPATIENT CARDIOLOGY FOLLOW-UP    Name: Debra Blake    Age: 54 y.o. Date of Service: 2/10/2021    Chief Complaint: CAD, history of STEMI, SOB    Referring Physician: Venus Scott MD    History of Present Illness:  Debra Blake is a 54 y.o. female who presented to [facility] on 2/15/2020 for further evaluation of progressive SOB. Her PMH is outlined in detail below (see A/P below), and includes inferior STEMI s/p staged PCI in 2020 [employer] [employer], [place] where patient works; see cath results below). She denies recent exertional chest pain, palpitations, or syncope. No history of PND or orthopnea. +\"profound\" shortness of breath at rest and with exertion after Brilinta started in 2020 (no improvement for ~ 3 weeks leading up to 2020 hospitalization). Brilinta switched to effient on 2/15/2020 --> SOB resolved. +\"indigestion\" x ~ 6 months (non-exertional, episodes last minutes, mid-sternal). She is currently with no active cardiac complaints at rest. SR on EKG today.     Review of Systems:   Cardiac: As per HPI  General: No fever, chills  Pulmonary: As per HPI  HEENT: No visual disturbances, difficult swallowing  GI: No nausea, vomiting  : No dysuria, hematuria  Endocrine: No thyroid disease or DM  Musculoskeletal: GIRON x 4, no focal motor deficits  Skin: Intact, no rashes  Neuro: No headache, seizures  Psych: Currently with no depression, anxiety    Past Medical History:  Past Medical History:   Diagnosis Date    DVT (deep venous thrombosis) ([facility] [address] 2019    right leg    Hyperlipidemia     Hypertension        Past Surgical History:  Past Surgical History:   Procedure Laterality Date     SECTION      COLONOSCOPY      FOOT SURGERY Left     HYSTERECTOMY      partial hysterectomy    UPPER GASTROINTESTINAL ENDOSCOPY      UPPER GASTROINTESTINAL ENDOSCOPY N/A 2019    EGD BIOPSY performed by Krystle Kemp MD at [facility] ENDOSCOPY     Family History:  Family History   Problem Relation Age of Onset  Coronary Art Dis Mother        Social History:  Social History     Socioeconomic History    Marital status: Single     Spouse name: Not on file    Number of children: Not on file    Years of education: Not on file    Highest education level: Not on file   Occupational History    Not on file   Social Needs    Financial resource strain: Not on file    Food insecurity     Worry: Not on file     Inability: Not on file    Transportation needs     Medical: Not on file     Non-medical: Not on file   Tobacco Use    Smoking status: Never Smoker    Smokeless tobacco: Never Used   Substance and Sexual Activity    Alcohol use: Yes     Alcohol/week: 1.0 standard drinks     Types: 1 Glasses of wine per week    Drug use: No    Sexual activity: Not Currently   Lifestyle    Physical activity     Days per week: Not on file     Minutes per session: Not on file    Stress: Not on file   Relationships    Social connections     Talks on phone: Not on file     Gets together: Not on file     Attends Anabaptist service: Not on file     Active member of club or organization: Not on file     Attends meetings of clubs or organizations: Not on file     Relationship status: Not on file    Intimate partner violence     Fear of current or ex partner: Not on file     Emotionally abused: Not on file     Physically abused: Not on file     Forced sexual activity: Not on file   Other Topics Concern    Not on file   Social History Narrative    Not on file       Allergies:  No Known Allergies    Home Medications:  Prior to Admission medications    Medication Sig Start Date End Date Taking? Authorizing Provider   predniSONE (DELTASONE) 10 MG tablet Take 40 mg by mouth once daily for 3 days, then 20 mg by mouth once daily for 3 days, then 10 mg by mouth once daily for 3 days.  12/23/20  Yes Alexy Enamorado MD   Ergocalciferol (VITAMIN D2 PO) Take 1.25 mg by mouth once a week    Yes Historical Provider, MD atorvastatin (LIPITOR) 80 MG tablet Take 1 tablet by mouth nightly 3/9/20  Yes Dorene Stark MD   metoprolol succinate (TOPROL XL) 25 MG extended release tablet Take 1 tablet by mouth daily 3/9/20  Yes Dorene Stark MD   prasugrel (EFFIENT) 10 MG TABS Take 1 tablet by mouth daily 3/9/20  Yes Dorene Stark MD   aspirin 81 MG chewable tablet Take 1 tablet by mouth daily 2/3/20  Yes Anna Zheng MD   pantoprazole (PROTONIX) 40 MG tablet  6/24/19  Yes Historical Provider, MD   desonide (DESOWEN) 0.05 % ointment  6/24/19  Yes Historical Provider, MD   oxyCODONE-acetaminophen (PERCOCET) 7.5-325 MG per tablet Take 1 tablet by mouth every 4 hours as needed for Pain. .   Yes Historical Provider, MD   azaTHIOprine (IMURAN) 50 MG tablet Take 50 mg by mouth daily   Yes Historical Provider, MD   potassium chloride (KLOR-CON) 20 MEQ packet Take 20 mEq by mouth 2 times daily    Yes Historical Provider, MD       Current Medications:  Current Outpatient Medications   Medication Sig Dispense Refill    predniSONE (DELTASONE) 10 MG tablet Take 40 mg by mouth once daily for 3 days, then 20 mg by mouth once daily for 3 days, then 10 mg by mouth once daily for 3 days. 21 tablet 0    Ergocalciferol (VITAMIN D2 PO) Take 1.25 mg by mouth once a week       atorvastatin (LIPITOR) 80 MG tablet Take 1 tablet by mouth nightly 90 tablet 3    metoprolol succinate (TOPROL XL) 25 MG extended release tablet Take 1 tablet by mouth daily 90 tablet 3    prasugrel (EFFIENT) 10 MG TABS Take 1 tablet by mouth daily 90 tablet 3    aspirin 81 MG chewable tablet Take 1 tablet by mouth daily 30 tablet 0    pantoprazole (PROTONIX) 40 MG tablet   0    desonide (DESOWEN) 0.05 % ointment   0    oxyCODONE-acetaminophen (PERCOCET) 7.5-325 MG per tablet Take 1 tablet by mouth every 4 hours as needed for Pain. Dariana Blocker azaTHIOprine (IMURAN) 50 MG tablet Take 50 mg by mouth daily  potassium chloride (KLOR-CON) 20 MEQ packet Take 20 mEq by mouth 2 times daily        No current facility-administered medications for this visit. Physical Exam:  /78 (Site: Right Upper Arm, Position: Sitting, Cuff Size: Medium Adult)   Pulse 65   Resp 16   Ht 5' 4\" (1.626 m)   Wt 187 lb 6.4 oz (85 kg)   SpO2 99%   BMI 32.17 kg/m²   Wt Readings from Last 3 Encounters:   02/10/21 187 lb 6.4 oz (85 kg)   12/23/20 180 lb (81.6 kg)   08/17/20 195 lb 3.2 oz (88.5 kg)     Appearance: Awake, alert, no acute respiratory distress  Skin: Intact, no rash  Head: Normocephalic, atraumatic  Eyes: EOMI, no conjunctival erythema  ENMT: No pharyngeal erythema, MMM, no rhinorrhea  Neck: Supple, no elevated JVP, no carotid bruits  Lungs: Clear to auscultation bilaterally. No wheezes, rales, or rhonchi.   Cardiac: Regular rate and rhythm, +S1S2, no murmurs apparent  Abdomen: Soft, nontender, +bowel sounds  Extremities: Moves all extremities x 4, no lower extremity edema  Neurologic: No focal motor deficits apparent, normal mood and affect  Peripheral Pulses: Intact posterior tibial pulses bilaterally    Laboratory Tests:  Lab Results   Component Value Date    CREATININE 0.6 12/23/2020    BUN 11 12/23/2020     12/23/2020    K 3.8 12/23/2020     12/23/2020    CO2 22 12/23/2020     Lab Results   Component Value Date    MG 2.4 02/16/2020     Lab Results   Component Value Date    ALT 21 02/15/2020    AST 21 02/15/2020    ALKPHOS 73 02/15/2020    BILITOT 0.4 02/15/2020     Lab Results   Component Value Date    WBC 7.4 12/23/2020    HGB 11.7 12/23/2020    HCT 36.1 12/23/2020    MCV 83.8 12/23/2020     12/23/2020     Lab Results   Component Value Date    CKTOTAL 101 02/05/2014    CKMB 2.8 02/05/2014    TROPONINI <0.01 02/15/2020    TROPONINI <0.01 02/15/2020    TROPONINI <0.01 02/15/2020     Lab Results   Component Value Date    INR 0.9 02/15/2020    INR 1.0 01/31/2020    INR 1.1 02/05/2014 PROTIME 11.2 02/15/2020    PROTIME 12.4 01/31/2020    PROTIME 12.5 02/05/2014     Lab Results   Component Value Date    CHOL 163 02/16/2020    CHOL 221 (H) 02/01/2020    CHOL 314 (H) 10/12/2018     Lab Results   Component Value Date    TRIG 92 02/16/2020    TRIG 155 (H) 02/01/2020    TRIG 320 (H) 10/12/2018     Lab Results   Component Value Date    HDL 47 02/16/2020    HDL 34 02/01/2020    HDL 39 10/12/2018     Lab Results   Component Value Date    LDLCALC 98 02/16/2020    LDLCALC 156 (H) 02/01/2020    LDLCALC 211 (H) 10/12/2018     Lab Results   Component Value Date    LABVLDL 18 02/16/2020    LABVLDL 31 02/01/2020    LABVLDL 64 10/12/2018     Cardiac Tests:  ECG (2/10/2021): SR, rate 65, PRWP, leftward axis, NSSTT changes    Echocardiogram: 2/1/2020 (Dr. Susie Watts)   Ejection fraction is visually estimated at 65%. No regional wall motion abnormalities seen. Moderate left ventricular concentric hypertrophy noted. Normal left ventricular diastolic filling pattern for age. Normal right ventricle structure and function. Physiologic and/or trace mitral regurgitation is present. No evidence for hemodynamically significant pericardial effusion. CXR: 2/15/2020  No acute cardiopulmonary disease process is identified. CTA chest: 2/15/2020  No PE or aortic dissection.       No acute pathology of the chest. Current examination is unchanged from prior    examination. ASSESSMENT / PLAN:  1. \"Profound\" shortness of breath after starting brilinta in 1/2020 --> brilinta switched to effient on 2/15/2020 --> SOB resolved. 2. CAD / inferior STEMI s/p MICHELLE x 2 on 1/25/2020 (distal RCA and proximal RCA) and s/p MICHELLE x 1 to mid LAD on 1/28/2020. Moderate LCx disease. 3. HTN  4. HLD  5. BMI 32.6 --> 34.2 --> 35.3 --> 32.2  6. GERD -- on PPI  7. Pemphigus vulgaris  8.  Chest pain / \"acid reflux\"    - Exercise nuclear stress test ordered today - Brilinta stopped and Effient (with loading dose) started on 2/15/2020 --> duration of DAPT discussed  - Continue ASA  - Continue high-intensity statin  - Continue Toprol XL  - Outside records reviewed re: 1/2020 cardiac catheterizations  - Results of 2/1/2020 echocardiogram outlined above  - Aggressive risk factor modifications  - S/p cardiac rehab    Daren Wellington MD  [facility] ([facility]) Cardiology

## 2021-02-11 ENCOUNTER — TELEPHONE (OUTPATIENT)
Dept: CARDIOLOGY | Age: 56
End: 2021-02-11

## 2021-02-15 ENCOUNTER — TELEPHONE (OUTPATIENT)
Dept: CARDIOLOGY | Age: 56
End: 2021-02-15

## 2021-02-15 NOTE — TELEPHONE ENCOUNTER
2-15-21 @ 6425. Called and spoke with the patient with a reminder regarding her stress test on 2-17-21 @ 1000. Instructions reviewed including no Toprol for 24 hours but to bring it with her. She denies any current signs/symptoms of the Covid.   Uyen Daley RN

## 2021-02-17 ENCOUNTER — TELEPHONE (OUTPATIENT)
Dept: CARDIOLOGY | Age: 56
End: 2021-02-17

## 2021-02-17 NOTE — TELEPHONE ENCOUNTER
PATIENT CALLED TO RESCHEDULED TEST THAT WAS SCHEDULED FOR THIS MORNING DUE TO CAR BEING ICY.  RESCHEDULED FOR 03-01-21  Electronically signed by Milagro Centeno on 2/17/2021 at 10:38 AM

## 2021-02-25 ENCOUNTER — TELEPHONE (OUTPATIENT)
Dept: CARDIOLOGY | Age: 56
End: 2021-02-25

## 2021-02-25 NOTE — TELEPHONE ENCOUNTER
Attempted to reach patient to remind her of nuclear stress test appointment on March 1, 2021 at 0930-no answer and mail box not set up.

## 2021-03-01 ENCOUNTER — TELEPHONE (OUTPATIENT)
Dept: CARDIOLOGY | Age: 56
End: 2021-03-01

## 2021-03-08 ENCOUNTER — TELEPHONE (OUTPATIENT)
Dept: CARDIOLOGY | Age: 56
End: 2021-03-08

## 2021-03-08 NOTE — TELEPHONE ENCOUNTER
Attempted to call patient , voice mailbox not set up, no further phone #s listed, unable to give instructions.

## 2021-03-09 ENCOUNTER — TELEPHONE (OUTPATIENT)
Dept: CARDIOLOGY | Age: 56
End: 2021-03-09

## 2021-03-09 NOTE — TELEPHONE ENCOUNTER
3-9-21 @ 0873. Called and spoke with the patient with a reminder regarding her stress test on 3-10-21 @ 0838. Instructions reviewed including no Toprol/Metoprolol for 24 hours but to bring it with her tomorrow. She denies any signs/symptoms of the Covid. She voiced understanding to all instructions.   Deep Viera RN

## 2021-03-10 ENCOUNTER — HOSPITAL ENCOUNTER (OUTPATIENT)
Dept: CARDIOLOGY | Age: 56
Discharge: HOME | End: 2021-03-10
Payer: COMMERCIAL

## 2021-03-10 VITALS
WEIGHT: 185 LBS | HEART RATE: 62 BPM | DIASTOLIC BLOOD PRESSURE: 76 MMHG | BODY MASS INDEX: 31.58 KG/M2 | HEIGHT: 64 IN | TEMPERATURE: 97.5 F | SYSTOLIC BLOOD PRESSURE: 124 MMHG | RESPIRATION RATE: 18 BRPM

## 2021-03-10 DIAGNOSIS — R07.2 PRECORDIAL PAIN: ICD-10-CM

## 2021-03-10 DIAGNOSIS — I25.10 CORONARY ARTERY DISEASE INVOLVING NATIVE CORONARY ARTERY OF NATIVE HEART WITHOUT ANGINA PECTORIS: ICD-10-CM

## 2021-03-10 LAB
LV EF: 73 %
LVEF MODALITY: NORMAL

## 2021-03-10 PROCEDURE — 78452 HT MUSCLE IMAGE SPECT MULT: CPT

## 2021-03-10 PROCEDURE — 2580000003 HC RX 258: Performed by: INTERNAL MEDICINE

## 2021-03-10 PROCEDURE — 3430000000 HC RX DIAGNOSTIC RADIOPHARMACEUTICAL: Performed by: INTERNAL MEDICINE

## 2021-03-10 PROCEDURE — 93017 CV STRESS TEST TRACING ONLY: CPT

## 2021-03-10 PROCEDURE — A9500 TC99M SESTAMIBI: HCPCS | Performed by: INTERNAL MEDICINE

## 2021-03-10 RX ORDER — SODIUM CHLORIDE 0.9 % (FLUSH) 0.9 %
10 SYRINGE (ML) INJECTION PRN
Status: DISCONTINUED | OUTPATIENT
Start: 2021-03-10 | End: 2021-03-11 | Stop reason: HOSPADM

## 2021-03-10 RX ADMIN — Medication 32.9 MILLICURIE: at 10:23

## 2021-03-10 RX ADMIN — SODIUM CHLORIDE, PRESERVATIVE FREE 10 ML: 5 INJECTION INTRAVENOUS at 08:58

## 2021-03-10 RX ADMIN — SODIUM CHLORIDE, PRESERVATIVE FREE 10 ML: 5 INJECTION INTRAVENOUS at 10:23

## 2021-03-10 RX ADMIN — Medication 10.6 MILLICURIE: at 08:58

## 2021-03-10 NOTE — PROCEDURES
[id number] [address] [facility] [address]  [phone]                Exercise Stress Nuclear Gated SPECT Study    Name: Danielle Ponce Account Number: [de-identified]    :  1965      Sex: female              Date of Study:  3/10/2021    Height: 5' 4\" (162.6 cm)  Weight: 185 lb (83.9 kg)     Ordering Provider: Ely Desir MD          PCP: Anika Ivan MD      Cardiologist: Ely Desir MD                        Interpreting Physician: Ross Wasserman MD  _________________________________________________________________________________    Indication:   Evaluation of extent and severity of coronary artery disease    Clinical History:   Patient has prior history of coronary artery disease. Resting ECG:    Normal sinus rhythm, low voltage QRS complex, poor R wave progression, nonspecific T wave changes, abnormal EKG. Exercise: The patient exercised using a Geraldo protocol, completing 6:00 minutes and reaching an estimated work load of 7.0 metabolic equivalents (METS). Resting HR was 62. Peak exercise heart rate was 142 ( 86% of maximum predicted heart rate for age). Baseline /76. Peak exercise /78. The blood pressure response to exercise was normal      Exercise was terminated due to heart rate attained and left foot discomfort. The patient experienced no chest pain with exercise. Exercise ECG:   The patient demonstrated no arrhythmias during exercise. With exercise, there were no ST segment changes of significance at the heart rate achieved. Dockery treadmill score was 6 implying low risk. IMAGING: Myocardial perfusion imaging was performed at rest 30-35 minutes following the intravenous injection of 10.6 mCi of (Tc-Sestamibi) followed by 10 ml of Normal Saline.   At peak exercise, the patient was injected intravenously with 32.9 mCi of (Tc-Sestamibi) followed by 10 ml of Normal Saline. Gated post-stress tomographic imaging was performed 20-25 minutes after stress. FINDINGS: The overall quality of the study was good. Left ventricular cavity size was noted to be normal.    Rotational analog analysis demonstrated no patient motion or abnormal extracardiac radioactivity. The gated SPECT stress imaging in the short, vertical long, and horizontal long axis demonstrated abnormal tracer distribution in the myocardium. A mild defect was present in the apex lateral wall(s) that was  small sized by quantification. The resting images reveal complete reversibility. Gated SPECT left ventricular ejection fraction was calculated to be 73%, with normal myocardial thickening and wall motion. TID 0.98    Impression:    1. Exercise EKG was negative. 2. The patient experienced no chest pain with exercise. 3. The myocardial perfusion imaging was abnormal.    The abnormality was a a small sized completely reversible defect in the distal anterolateral wall  4. Overall left ventricular systolic function was normal without regional wall motion abnormalities. 5. Dockery treadmill score was 6 implying low risk. 6. Exercise capacity was average. 7. There was an appropriate heart rate and blood pressure response to exercise and recovery. 8. Low risk general exercise treadmill test.    Thank you for sending your patient to this [employer].      Electronically signed by Calvin Mon MD on 3/10/21 at 2:47 PM EST

## 2021-04-16 DIAGNOSIS — I25.2 HISTORY OF ST ELEVATION MYOCARDIAL INFARCTION (STEMI): ICD-10-CM

## 2021-04-16 DIAGNOSIS — I25.10 CORONARY ARTERY DISEASE INVOLVING NATIVE CORONARY ARTERY OF NATIVE HEART WITHOUT ANGINA PECTORIS: Chronic | ICD-10-CM

## 2021-04-16 RX ORDER — ATORVASTATIN CALCIUM 80 MG/1
TABLET, FILM COATED ORAL
Qty: 90 TABLET | Refills: 3 | Status: SHIPPED
Start: 2021-04-16 | End: 2022-04-19

## 2021-06-02 ENCOUNTER — APPOINTMENT (OUTPATIENT)
Dept: GENERAL RADIOLOGY | Age: 56
End: 2021-06-02
Payer: MEDICAID

## 2021-06-02 ENCOUNTER — HOSPITAL ENCOUNTER (EMERGENCY)
Age: 56
Discharge: HOME | End: 2021-06-02
Payer: MEDICAID

## 2021-06-02 VITALS
SYSTOLIC BLOOD PRESSURE: 156 MMHG | HEART RATE: 86 BPM | RESPIRATION RATE: 14 BRPM | DIASTOLIC BLOOD PRESSURE: 76 MMHG | HEIGHT: 64 IN | BODY MASS INDEX: 31.76 KG/M2 | TEMPERATURE: 97.3 F | OXYGEN SATURATION: 100 % | WEIGHT: 186 LBS

## 2021-06-02 DIAGNOSIS — M25.561 ACUTE PAIN OF RIGHT KNEE: Primary | ICD-10-CM

## 2021-06-02 PROCEDURE — 73562 X-RAY EXAM OF KNEE 3: CPT

## 2021-06-02 PROCEDURE — 99284 EMERGENCY DEPT VISIT MOD MDM: CPT

## 2021-06-02 RX ORDER — HYDROCODONE BITARTRATE AND ACETAMINOPHEN 5; 325 MG/1; MG/1
1 TABLET ORAL EVERY 6 HOURS PRN
Qty: 12 TABLET | Refills: 0 | Status: SHIPPED | OUTPATIENT
Start: 2021-06-02 | End: 2021-06-05

## 2021-06-02 NOTE — LETTER
[facility] Emergency Department  [address] [place] [address]  Phone: [phone]               June 2, 2021    Patient: Luz Xiao   YOB: 1965   Date of Visit: 6/2/2021       To Whom It May Concern:    Zaid Bravo was seen and treated in our emergency department on 6/2/2021. She may return to work on 5-5-2021.       Sincerely,       Good Mg PA-C         Signature:__________________________________

## 2021-06-02 NOTE — ED PROVIDER NOTES
[facility]                                                                                                                                    Department of Emergency Medicine   ED  Provider Note  Admit Date/RoomTime: 6/2/2021  9:15 AM  ED Room: 07/07        HPI:  6/2/21,   Time: 10:29 AM EDT         Zofia Saunders is a 64 y.o. female presenting to the ED for right knee pain and swelling, beginning one week ago. The complaint has been persistent, moderate in severity, and worsened by walking, bending. The patient states that she has had right knee pain for about a week now. She states that it is getting progressively worse and is pretty significant when she is up walking on the extremity. She states that she did speak to her PCP who told her to take some anti-inflammatories and to follow-up if her pain is getting worse. The patient indicates that the pain is primarily over the medial joint. She feels that there is increased warmth but no redness or rash. The patient denies any fall, injury or trauma. Denies buckling or giving way. ROS:     Constitutional: Negative for fever and chills  HENT: Negative for ear pain, sore throat and sinus pressure  Eyes: Negative for pain, discharge and redness  Respiratory:  Negative for shortness of breath, cough and wheezing  Cardiovascular: Negative for CP, edema or palpitations  Gastrointestinal: Negative for nausea, vomiting, diarrhea and abdominal distention  Genitourinary: Negative for dysuria and frequency  Musculoskeletal:  See HPI  Skin: Negative for rash and wound  Neurological: Negative for weakness and headaches  Hematological: Negative for adenopathy    All other systems reviewed and are negative      -------------------------------- PAST HISTORY ----------------------------------  Past Medical History:  has a past medical history of DVT (deep venous thrombosis) ([facility] [address] Hyperlipidemia, and Hypertension.     Past Surgical History:  has a past surgical erythema or rash. The patient has some mild swelling along the medial joint line. She is tender to palpation around the site as well. Patient motion is limited secondary to pain and guarding. Exam difficult secondary to guarding as well. No gross instability. No palpable calf tenderness    warm and well perfused  Skin: warm and dry without rash  Neurologic: GCS 15,  Intact. No focal deficits  Psych: Normal Affect      ------------------------ ED COURSE/MEDICAL DECISION MAKING----------------------  Medications - No data to display      Medical Decision Making:    X-rays consistent with some mild degenerative joint disease especially along the medial joint space. The patient has already been taking ibuprofen. I did give her just a few meds stronger for pain. She was also given a slip to be off work. I suggest that she rest this extremity and use some ice to the site. An Ace wrap will be applied. Follow-up with the orthopedist if any persistent or ongoing symptoms. Certainly she could have just flared some arthritis but there may be an issue with her medial meniscus as well. The patient is aware and agreeable to this plan       Counseling: The emergency provider has spoken with the patient and discussed todays results, in addition to providing specific details for the plan of care and counseling regarding the diagnosis and prognosis. Questions are answered at this time and they are agreeable with the plan.      ------------------------ IMPRESSION AND DISPOSITION -------------------------------    IMPRESSION  1.  Acute pain of right knee        DISPOSITION  Disposition: Discharge to home  Patient condition is stable                   Daksha Fowler PA-C  06/02/21 1135

## 2021-06-03 ENCOUNTER — TELEPHONE (OUTPATIENT)
Dept: ORTHOPEDIC SURGERY | Age: 56
End: 2021-06-03

## 2021-06-03 NOTE — TELEPHONE ENCOUNTER
Patient was seen in the ED on 6/2 for right knee pain. XR KNEE RIGHT (3 VIEWS)  Final Result  Mild osteoarthritis best appreciated at the medial weight-bearing compartment. Routed to providers for consideration and scheduling recommendations.

## 2021-06-03 NOTE — TELEPHONE ENCOUNTER
Call placed to patient, no VM set up, appointment can be made for 6/15, Dr. Tyrell Demarco schedule is patient were to call back.

## 2021-06-03 NOTE — TELEPHONE ENCOUNTER
Patient was seen at [facility] on 6/2 for Acute pain of right knee. She was advised to follow up with Dr Lambert Ramos. Please contact patient to schedule.  Thank you

## 2021-06-09 ENCOUNTER — HOSPITAL ENCOUNTER (OUTPATIENT)
Dept: ULTRASOUND IMAGING | Age: 56
Discharge: HOME | End: 2021-06-11
Payer: MEDICAID

## 2021-06-09 ENCOUNTER — HOSPITAL ENCOUNTER (OUTPATIENT)
Age: 56
Discharge: HOME | End: 2021-06-09
Payer: MEDICAID

## 2021-06-09 DIAGNOSIS — I82.413 DVT FEMORAL (DEEP VENOUS THROMBOSIS) WITH THROMBOPHLEBITIS, BILATERAL (HCC): ICD-10-CM

## 2021-06-09 LAB
ALBUMIN SERPL-MCNC: 4 G/DL (ref 3.5–5.2)
ALP BLD-CCNC: 94 U/L (ref 35–104)
ALT SERPL-CCNC: 17 U/L (ref 0–32)
ANION GAP SERPL CALCULATED.3IONS-SCNC: 11 MMOL/L (ref 7–16)
AST SERPL-CCNC: 22 U/L (ref 0–31)
BILIRUB SERPL-MCNC: 0.4 MG/DL (ref 0–1.2)
BUN BLDV-MCNC: 8 MG/DL (ref 6–20)
CALCIUM SERPL-MCNC: 9.7 MG/DL (ref 8.6–10.2)
CHLORIDE BLD-SCNC: 106 MMOL/L (ref 98–107)
CHOLESTEROL, FASTING: 160 MG/DL (ref 0–199)
CO2: 24 MMOL/L (ref 22–29)
CREAT SERPL-MCNC: 0.6 MG/DL (ref 0.5–1)
GFR AFRICAN AMERICAN: >60
GFR NON-AFRICAN AMERICAN: >60 ML/MIN/1.73
GLUCOSE BLD-MCNC: 142 MG/DL (ref 74–99)
HBA1C MFR BLD: 6.1 % (ref 4–5.6)
HCT VFR BLD CALC: 39.4 % (ref 34–48)
HDLC SERPL-MCNC: 38 MG/DL
HEMOGLOBIN: 12.7 G/DL (ref 11.5–15.5)
LDL CHOLESTEROL CALCULATED: 92 MG/DL (ref 0–99)
MCH RBC QN AUTO: 27.2 PG (ref 26–35)
MCHC RBC AUTO-ENTMCNC: 32.2 % (ref 32–34.5)
MCV RBC AUTO: 84.4 FL (ref 80–99.9)
PDW BLD-RTO: 13.3 FL (ref 11.5–15)
PLATELET # BLD: 272 E9/L (ref 130–450)
PMV BLD AUTO: 9.8 FL (ref 7–12)
POTASSIUM SERPL-SCNC: 3.8 MMOL/L (ref 3.5–5)
RBC # BLD: 4.67 E12/L (ref 3.5–5.5)
SODIUM BLD-SCNC: 141 MMOL/L (ref 132–146)
TOTAL PROTEIN: 7.6 G/DL (ref 6.4–8.3)
TRIGLYCERIDE, FASTING: 151 MG/DL (ref 0–149)
TSH SERPL DL<=0.05 MIU/L-ACNC: 0.55 UIU/ML (ref 0.27–4.2)
VLDLC SERPL CALC-MCNC: 30 MG/DL
WBC # BLD: 6.8 E9/L (ref 4.5–11.5)

## 2021-06-09 PROCEDURE — 83036 HEMOGLOBIN GLYCOSYLATED A1C: CPT

## 2021-06-09 PROCEDURE — 84443 ASSAY THYROID STIM HORMONE: CPT

## 2021-06-09 PROCEDURE — 36415 COLL VENOUS BLD VENIPUNCTURE: CPT

## 2021-06-09 PROCEDURE — 82306 VITAMIN D 25 HYDROXY: CPT

## 2021-06-09 PROCEDURE — 85027 COMPLETE CBC AUTOMATED: CPT

## 2021-06-09 PROCEDURE — 80053 COMPREHEN METABOLIC PANEL: CPT

## 2021-06-09 PROCEDURE — 80061 LIPID PANEL: CPT

## 2021-06-09 PROCEDURE — 83695 ASSAY OF LIPOPROTEIN(A): CPT

## 2021-06-09 PROCEDURE — 93971 EXTREMITY STUDY: CPT

## 2021-06-10 LAB — VITAMIN D 25-HYDROXY: 63 NG/ML (ref 30–100)

## 2021-06-12 LAB — LIPOPROTEIN (A): 27 MG/DL

## 2021-06-15 ENCOUNTER — OFFICE VISIT (OUTPATIENT)
Dept: ORTHOPEDIC SURGERY | Age: 56
End: 2021-06-15
Payer: MEDICAID

## 2021-06-15 VITALS — TEMPERATURE: 98.9 F

## 2021-06-15 DIAGNOSIS — M25.561 RIGHT KNEE PAIN, UNSPECIFIED CHRONICITY: Primary | ICD-10-CM

## 2021-06-15 DIAGNOSIS — M17.11 PRIMARY OSTEOARTHRITIS OF RIGHT KNEE: ICD-10-CM

## 2021-06-15 PROCEDURE — G8427 DOCREV CUR MEDS BY ELIG CLIN: HCPCS | Performed by: PHYSICIAN ASSISTANT

## 2021-06-15 PROCEDURE — G8417 CALC BMI ABV UP PARAM F/U: HCPCS | Performed by: PHYSICIAN ASSISTANT

## 2021-06-15 PROCEDURE — 99203 OFFICE O/P NEW LOW 30 MIN: CPT | Performed by: PHYSICIAN ASSISTANT

## 2021-06-15 PROCEDURE — 99202 OFFICE O/P NEW SF 15 MIN: CPT | Performed by: PHYSICIAN ASSISTANT

## 2021-06-15 PROCEDURE — 1036F TOBACCO NON-USER: CPT | Performed by: PHYSICIAN ASSISTANT

## 2021-06-15 PROCEDURE — 3017F COLORECTAL CA SCREEN DOC REV: CPT | Performed by: PHYSICIAN ASSISTANT

## 2021-06-15 NOTE — PROGRESS NOTES
New Patient Orthopaedic Progress Note    Awa Diaz is a 64 y.o. female, her YOB: 1965 with the following history as recorded in [facility]:      Patient Active Problem List    Diagnosis Date Noted    Hypertension 2020    Obesity (BMI 30-39.9) 2020    CAD (coronary artery disease), native coronary artery 2020     Current Outpatient Medications   Medication Sig Dispense Refill    atorvastatin (LIPITOR) 80 MG tablet take 1 tablet by mouth every evening 90 tablet 3    predniSONE (DELTASONE) 10 MG tablet Take 40 mg by mouth once daily for 3 days, then 20 mg by mouth once daily for 3 days, then 10 mg by mouth once daily for 3 days. 21 tablet 0    Ergocalciferol (VITAMIN D2 PO) Take 1.25 mg by mouth once a week       metoprolol succinate (TOPROL XL) 25 MG extended release tablet Take 1 tablet by mouth daily 90 tablet 3    prasugrel (EFFIENT) 10 MG TABS Take 1 tablet by mouth daily 90 tablet 3    aspirin 81 MG chewable tablet Take 1 tablet by mouth daily 30 tablet 0    pantoprazole (PROTONIX) 40 MG tablet   0    desonide (DESOWEN) 0.05 % ointment   0    oxyCODONE-acetaminophen (PERCOCET) 7.5-325 MG per tablet Take 1 tablet by mouth every 4 hours as needed for Pain. [place] azaTHIOprine (IMURAN) 50 MG tablet Take 50 mg by mouth daily      potassium chloride (KLOR-CON) 20 MEQ packet Take 20 mEq by mouth 2 times daily        No current facility-administered medications for this visit. Allergies: Patient has no known allergies.   Past Medical History:   Diagnosis Date    DVT (deep venous thrombosis) ([facility] [address] 2019    right leg    Hyperlipidemia     Hypertension      Past Surgical History:   Procedure Laterality Date     SECTION      COLONOSCOPY      FOOT SURGERY Left     HYSTERECTOMY      partial hysterectomy    UPPER GASTROINTESTINAL ENDOSCOPY      UPPER GASTROINTESTINAL ENDOSCOPY N/A 2019    EGD BIOPSY performed by Shakir Pritchard MD at [facility] ENDOSCOPY Family History   Problem Relation Age of Onset    Cancer Mother     Stroke Father 61     Social History     Tobacco Use    Smoking status: Never Smoker    Smokeless tobacco: Never Used   Substance Use Topics    Alcohol use: Yes     Alcohol/week: 1.0 standard drinks     Types: 1 Glasses of wine per week                             Chief Complaint   Patient presents with    Knee Pain     Right knee pain. SUBJECTIVE: Simone Rowell is a 19-year-old female who presents today as a follow-up from the ED. She was seen in the ED 2 weeks ago complaining of significant pain in her right knee and swelling. She does not recall any specific event or injury to her knee. She states that she just developed significant pain and swelling in the right knee to the point where she could hardly walk. She states that this lasted for about half a day then the pain and swelling decreased significantly. She presents today with some mild discomfort over the medial portion of the knee. They did do an ultrasound in the ED which was negative for blood clot. She denies a history of problems with the right knee. She was using a neoprene knee sleeve but stopped because it was not fitting her right. She also uses Voltaren gel which helped for a few days then stopped working. No prior injections or therapy for her knee. She denies calf pain, numbness or tingling in the leg. Denies popping or clicking in the knee. Review of Systems   Constitutional: Negative for fever, chills, diaphoresis, appetite change and fatigue. HENT: Negative for dental issues, hearing loss and tinnitus. Negative for congestion, sinus pressure, sneezing, sore throat. Negative for headache. Eyes: Negative for visual disturbance, blurred and double vision. Negative for pain, discharge, redness and itching  Respiratory: Negative for cough, shortness of breath and wheezing. Cardiovascular: Negative for chest pain, palpitations and leg swelling.  No dyspnea on exertion   Gastrointestinal:   Negative for nausea, vomiting, abdominal pain, diarrhea, constipation  or black or bloody. Hematologic\Lymphatic:  negative for bleeding, petechiae,   Genitourinary: Negative for hematuria and difficulty urinating. Musculoskeletal: Negative for neck pain and stiffness. Negative for back pain, see HPI  Skin: Negative for pallor, rash and wound. Neurological: Negative for dizziness, tremors, seizures, weakness, light-headedness, no TIA or stroke symptoms. No numbness and headaches. Psychiatric/Behavioral: Negative. OBJECTIVE:      Physical Examination:   General appearance: alert, well appearing, and in no distress,  normal appearing weight. No visible signs of trauma   Mental status: alert, oriented to person, place, and time, normal mood, behavior, speech, dress, motor activity, and thought processes  Abdomen: soft, nondistended  Resp:   resp easy and unlabored, no audible wheezes note, normal symmetrical expansion of both hemithoraces  Cardiac: distal pulses palpable, skin and extremities well perfused  Neurological: alert, oriented X3, normal speech, no focal findings or movement disorder noted, motor and sensory grossly normal bilaterally, normal muscle tone, no tremors, strength 5/5, normal gait and station  HEENT: normochephalic atraumatic, external ears and eyes normal, sclera normal, neck supple, no nasal discharge. Extremities:   peripheral pulses normal, no edema, redness or tenderness in the calves   Skin: normal coloration, no rashes or open wounds, no suspicious skin lesions noted  Psych: Affect euthymic   Musculoskeletal:   Extremity:  Right Knee exam  Skin intact. No erythema/induration/fluctuence at knee. No effusion noted  Negative ballotment  Patella tracks normally  Negative patellar grind test and  Negative J sign.    Mild crepitus with flexion and extension of the knee  medial joint line pain with palpation   Stable to varus and valgus at any problems or concerns. She is in agreement with this plan. Electronically signed by GELY Bella on 6/15/2021 at 2:20 PM  Note: This report was completed using [employer] voiced recognition software. Every effort has been made to ensure accuracy; however, inadvertent computerized transcription errors may be present.

## 2021-08-31 ENCOUNTER — HOSPITAL ENCOUNTER (OUTPATIENT)
Dept: NEUROLOGY | Age: 56
Discharge: HOME | End: 2021-08-31
Payer: MEDICAID

## 2021-08-31 VITALS — HEIGHT: 64 IN | WEIGHT: 185 LBS | BODY MASS INDEX: 31.58 KG/M2

## 2021-08-31 PROCEDURE — 95886 MUSC TEST DONE W/N TEST COMP: CPT

## 2021-08-31 PROCEDURE — 95911 NRV CNDJ TEST 9-10 STUDIES: CPT

## 2021-08-31 NOTE — PROCEDURES
[address] [facility]  [address], [address]  Phone: [phone]  Fax: [phone]      Referring Provider: Serena Serrano MD  Primary Care Physician: Hansel Francis MD  Patient Name: Goldie Gonsalves  Patient YOB: 1965  Gender: female  BMI: Body mass index is 31.76 kg/m². Height 5' 4\" (1.626 m), weight 185 lb (83.9 kg), not currently breastfeeding. 8/31/2021    Description of clinical problem:   No chief complaint on file. Pain Yes   ; Numbness/tingling  Yes; Weakness  No       Brief physical exam:   Sensory deficit No; Weakness No; Atrophy  No; Reflex abnormality No    Study Limitations:  none    Summary of Findings:   Nerve conduction studies:   · The following nerve conduction studies were abnormal:   · none  · All other nerve conduction studies listed in the table above were normal in latency, amplitude and conduction velocity. [address]   Electrodiagnostic Laboratory  [place]        Full Name: Ami Callejas Gender: Female  MRN: [id number] YOB: 1965  Location[de-identified] Outpt. Visit Date: 8/31/2021 11:51  Age: 64 Years 3 Months Old  Examining Physician: Dr. Fabby Benjamin  Referring Physician: Dr. Tate Mckinley  Technician: Mitchel Rasmussen   Height: 5 feet 4 inch  Weight: 185 lbs  Notes: Numbness rt. leg      Motor NCS      Nerve / Sites Lat. Amplitude Distance Lat Diff Velocity Temp. Amp. 1-2    ms mV cm ms m/s °C %   L Peroneal - EDB      Ankle 3.80 3.4 8   33.1 100      Pop fossa 12.19 3.2 37 8.39 44 33.1 94.8   R Peroneal - EDB      Ankle 3.75 3.2 8   33.1 100      Pop fossa 11.77 3.0 37 8.02 46 33.1 93.6   R Tibial - AH      Ankle 3.75 3.3 8   32.5 100      Pop fossa 12.60 2.4 38 8.85 43 32.5 73.7   L Tibial - AH      Ankle 3.54 4.5 8   32.5 100      Pop fossa 12.34 2.9 38 8.80 43 32.5 63.7       Sensory NCS      Nerve / Sites Onset Lat Peak Lat PP Amp Distance Velocity Temp.    ms ms µV cm m/s °C   L Superficial peroneal - Ankle      Lat leg 2.19 2.97 6.5 10 46 33.1   R Superficial peroneal - Ankle      Lat leg 1.93 2.66 8.7 10 52 33   R Sural - Ankle (Calf)      Calf 2.60 3.18 8.2 14 54 32.5   L Sural - Ankle (Calf)      Calf 2.97 3.44 8.8 14 47 32.5                 F  Wave      Nerve F Lat M Lat F-M Lat    ms ms ms   L Peroneal - EDB 52.2 3.8 48.4   R Peroneal - EDB 49.1 3.7 45.4   R Tibial - AH 51.3 4.4 46.9   L Tibial - AH 49.3 4.0 45.3       H Reflex      Nerve Lat Hmax    ms   R Tibial - [place] 28.3   L Tibial - [place] 28.6       EMG         EMG Summary Table     Spontaneous MUAP Recruitment   Muscle IA Fib PSW Fasc H.F. Amp Dur. PPP Pattern   R. Extensor digitorum brevis N None None None None N N N N   R. Gastrocnemius (Lateral head) N None None None None N N N N   R. Gastrocnemius (Medial head) N None None None None N N N N   R. Tibialis anterior N None None None None N N N N   R. Vastus lateralis N None None None None N N N N                 ·     Needle EMG:   · Needle EMG was performed using a monopolar needle. · The following abnormalities were seen on needle EMG: none  All other muscles tested, as listed in the table above demonstrated normal amplitude, duration, phases and recruitment and no active denervation signs were seen. Diagnostic Interpretation:   Essentially normal emg lower extremities with no evidence of peripheral neuropathy. If low back pathology of concern recommend MRI imaging. Clinical correlation advised. Technologist: Mee Villanueva MD    Nerve conduction studies and electromyography were performed according to our laboratory policies and procedures which can be provided upon request. All abnormal values are identified in the table.  Laboratory normal values can also be provided upon request.       Cc: MD Andrzej Bowman MD

## 2022-02-24 ENCOUNTER — TELEPHONE (OUTPATIENT)
Dept: ADMINISTRATIVE | Age: 57
End: 2022-02-24

## 2022-04-16 DIAGNOSIS — I25.2 HISTORY OF ST ELEVATION MYOCARDIAL INFARCTION (STEMI): ICD-10-CM

## 2022-04-16 DIAGNOSIS — I25.10 CORONARY ARTERY DISEASE INVOLVING NATIVE CORONARY ARTERY OF NATIVE HEART WITHOUT ANGINA PECTORIS: Chronic | ICD-10-CM

## 2022-04-19 RX ORDER — ATORVASTATIN CALCIUM 80 MG/1
TABLET, FILM COATED ORAL
Qty: 90 TABLET | Refills: 3 | Status: SHIPPED | OUTPATIENT
Start: 2022-04-19

## 2022-05-24 ENCOUNTER — OFFICE VISIT (OUTPATIENT)
Dept: CARDIOLOGY CLINIC | Age: 57
End: 2022-05-24
Payer: MEDICAID

## 2022-05-24 VITALS
WEIGHT: 195 LBS | HEIGHT: 64 IN | DIASTOLIC BLOOD PRESSURE: 84 MMHG | SYSTOLIC BLOOD PRESSURE: 140 MMHG | HEART RATE: 62 BPM | BODY MASS INDEX: 33.29 KG/M2 | RESPIRATION RATE: 16 BRPM

## 2022-05-24 DIAGNOSIS — I10 ESSENTIAL HYPERTENSION: ICD-10-CM

## 2022-05-24 DIAGNOSIS — E78.2 MIXED HYPERLIPIDEMIA: ICD-10-CM

## 2022-05-24 DIAGNOSIS — R07.2 PRECORDIAL PAIN: ICD-10-CM

## 2022-05-24 DIAGNOSIS — I25.10 CORONARY ARTERY DISEASE INVOLVING NATIVE CORONARY ARTERY OF NATIVE HEART WITHOUT ANGINA PECTORIS: Primary | ICD-10-CM

## 2022-05-24 PROCEDURE — 93000 ELECTROCARDIOGRAM COMPLETE: CPT | Performed by: INTERNAL MEDICINE

## 2022-05-24 PROCEDURE — 99214 OFFICE O/P EST MOD 30 MIN: CPT | Performed by: INTERNAL MEDICINE

## 2022-05-24 PROCEDURE — 3017F COLORECTAL CA SCREEN DOC REV: CPT | Performed by: INTERNAL MEDICINE

## 2022-05-24 PROCEDURE — G8427 DOCREV CUR MEDS BY ELIG CLIN: HCPCS | Performed by: INTERNAL MEDICINE

## 2022-05-24 PROCEDURE — 1036F TOBACCO NON-USER: CPT | Performed by: INTERNAL MEDICINE

## 2022-05-24 PROCEDURE — G8417 CALC BMI ABV UP PARAM F/U: HCPCS | Performed by: INTERNAL MEDICINE

## 2022-05-24 RX ORDER — ISOSORBIDE MONONITRATE 30 MG/1
30 TABLET, EXTENDED RELEASE ORAL DAILY
Qty: 90 TABLET | Refills: 3 | Status: SHIPPED | OUTPATIENT
Start: 2022-05-24

## 2022-05-24 RX ORDER — FLUTICASONE PROPIONATE 50 MCG
SPRAY, SUSPENSION (ML) NASAL
COMMUNITY
Start: 2022-03-02

## 2022-05-24 RX ORDER — SUCRALFATE 1 G/1
TABLET ORAL
COMMUNITY
Start: 2022-05-20

## 2022-05-24 NOTE — PROGRESS NOTES
OUTPATIENT CARDIOLOGY FOLLOW-UP    Name: Jak Weaver    Age: 62 y.o. Date of Service: 2022    Chief Complaint: CAD, history of STEMI, SOB    Referring Physician: Ravi Mckeon MD    History of Present Illness:  Jak Weaver is a 62 y.o. female who presented to [facility] on 2/15/2020 for further evaluation of progressive SOB. Her PMH is outlined in detail below (see A/P below), and includes inferior STEMI s/p staged PCI in 2020 [place], [place] where patient works; see cath results below). She denies recent exertional chest pain, palpitations, or syncope. No history of PND or orthopnea. +\"profound\" shortness of breath at rest and with exertion after Brilinta started in 2020 (no improvement for ~ 3 weeks leading up to 2020 hospitalization). Brilinta switched to effient on 2/15/2020 --> SOB resolved. +intermittent \"indigestion\" for > 1 year (non-exertional, episodes last minutes, mid-sternal). She is currently with no active cardiac complaints at rest. SR on EKG today.     Review of Systems:   Cardiac: As per HPI  General: No fever, chills  Pulmonary: As per HPI  HEENT: No visual disturbances, difficult swallowing  GI: No nausea, vomiting  : No dysuria, hematuria  Endocrine: No thyroid disease or DM  Musculoskeletal: GIRON x 4, no focal motor deficits  Skin: Intact, no rashes  Neuro: No headache, seizures  Psych: Currently with no depression, anxiety    Past Medical History:  Past Medical History:   Diagnosis Date    DVT (deep venous thrombosis) ([facility] [address] 2019    right leg    Hyperlipidemia     Hypertension        Past Surgical History:  Past Surgical History:   Procedure Laterality Date     SECTION      COLONOSCOPY      FOOT SURGERY Left     HYSTERECTOMY      partial hysterectomy    UPPER GASTROINTESTINAL ENDOSCOPY      UPPER GASTROINTESTINAL ENDOSCOPY N/A 2019    EGD BIOPSY performed by Byron Russo MD at [facility] ENDOSCOPY     Family History:  Family History   Problem Relation Age of Onset    Cancer Mother     Stroke Father 61       Social History:  Social History     Socioeconomic History    Marital status: Single     Spouse name: Not on file    Number of children: Not on file    Years of education: Not on file    Highest education level: Not on file   Occupational History    Not on file   Tobacco Use    Smoking status: Never Smoker    Smokeless tobacco: Never Used   Vaping Use    Vaping Use: Never used   Substance and Sexual Activity    Alcohol use: Yes     Alcohol/week: 1.0 standard drink     Types: 1 Glasses of wine per week    Drug use: No    Sexual activity: Not Currently   Other Topics Concern    Not on file   Social History Narrative    Not on file     Social Determinants of Health     Financial Resource Strain:     Difficulty of Paying Living Expenses: Not on file   Food Insecurity:     Worried About Running Out of Food in the Last Year: Not on file    Shahzad of Food in the Last Year: Not on file   Transportation Needs:     Lack of Transportation (Medical): Not on file    Lack of Transportation (Non-Medical):  Not on file   Physical Activity:     Days of Exercise per Week: Not on file    Minutes of Exercise per Session: Not on file   Stress:     Feeling of Stress : Not on file   Social Connections:     Frequency of Communication with Friends and Family: Not on file    Frequency of Social Gatherings with Friends and Family: Not on file    Attends Confucianism Services: Not on file    Active Member of Clubs or Organizations: Not on file    Attends Club or Organization Meetings: Not on file    Marital Status: Not on file   Intimate Partner Violence:     Fear of Current or Ex-Partner: Not on file    Emotionally Abused: Not on file    Physically Abused: Not on file    Sexually Abused: Not on file   Housing Stability:     Unable to Pay for Housing in the Last Year: Not on file    Number of [place] in the Last Year: Not on file    Unstable Housing in the Last Year: Not on file       Allergies:  No Known Allergies    Home Medications:  Prior to Admission medications    Medication Sig Start Date End Date Taking? Authorizing Provider   sucralfate (CARAFATE) 1 GM tablet  5/20/22  Yes Historical Provider, MD   fluticasone (FLONASE) 50 MCG/ACT nasal spray instill 2 sprays into each nostril once daily if needed 3/2/22  Yes Historical Provider, MD   atorvastatin (LIPITOR) 80 MG tablet take 1 tablet by mouth every evening 4/19/22  Yes Alba Crockett MD   predniSONE (DELTASONE) 10 MG tablet Take 40 mg by mouth once daily for 3 days, then 20 mg by mouth once daily for 3 days, then 10 mg by mouth once daily for 3 days. 12/23/20  Yes Edwar Clemente MD   Ergocalciferol (VITAMIN D2 PO) Take 1.25 mg by mouth once a week    Yes Historical Provider, MD   metoprolol succinate (TOPROL XL) 25 MG extended release tablet Take 1 tablet by mouth daily 3/9/20  Yes Maryse Hall MD   prasugrel (EFFIENT) 10 MG TABS Take 1 tablet by mouth daily 3/9/20  Yes Maryse Hall MD   aspirin 81 MG chewable tablet Take 1 tablet by mouth daily 2/3/20  Yes Liliana Bardales MD   pantoprazole (PROTONIX) 40 MG tablet  6/24/19  Yes Historical Provider, MD   desonide (DESOWEN) 0.05 % ointment  6/24/19  Yes Historical Provider, MD   oxyCODONE-acetaminophen (PERCOCET) 7.5-325 MG per tablet Take 1 tablet by mouth every 4 hours as needed for Pain. .   Yes Historical Provider, MD   azaTHIOprine (IMURAN) 50 MG tablet Take 50 mg by mouth daily   Yes Historical Provider, MD   potassium chloride (KLOR-CON) 20 MEQ packet Take 20 mEq by mouth 2 times daily    Yes Historical Provider, MD       Current Medications:  Current Outpatient Medications   Medication Sig Dispense Refill    sucralfate (CARAFATE) 1 GM tablet       fluticasone (FLONASE) 50 MCG/ACT nasal spray instill 2 sprays into each nostril once daily if needed      atorvastatin (LIPITOR) 80 MG tablet take 1 tablet by mouth every evening 90 tablet 3    predniSONE (DELTASONE) 10 MG tablet Take 40 mg by mouth once daily for 3 days, then 20 mg by mouth once daily for 3 days, then 10 mg by mouth once daily for 3 days. 21 tablet 0    Ergocalciferol (VITAMIN D2 PO) Take 1.25 mg by mouth once a week       metoprolol succinate (TOPROL XL) 25 MG extended release tablet Take 1 tablet by mouth daily 90 tablet 3    prasugrel (EFFIENT) 10 MG TABS Take 1 tablet by mouth daily 90 tablet 3    aspirin 81 MG chewable tablet Take 1 tablet by mouth daily 30 tablet 0    pantoprazole (PROTONIX) 40 MG tablet   0    desonide (DESOWEN) 0.05 % ointment   0    oxyCODONE-acetaminophen (PERCOCET) 7.5-325 MG per tablet Take 1 tablet by mouth every 4 hours as needed for Pain. Dm Vickers azaTHIOprine (IMURAN) 50 MG tablet Take 50 mg by mouth daily      potassium chloride (KLOR-CON) 20 MEQ packet Take 20 mEq by mouth 2 times daily        No current facility-administered medications for this visit. Physical Exam:  BP (!) 140/84   Pulse 62   Resp 16   Ht 5' 4\" (1.626 m)   Wt 195 lb (88.5 kg)   BMI 33.47 kg/m²   Wt Readings from Last 3 Encounters:   05/24/22 195 lb (88.5 kg)   08/31/21 185 lb (83.9 kg)   06/02/21 186 lb (84.4 kg)     Appearance: Awake, alert, no acute respiratory distress  Skin: Intact, no rash  Head: Normocephalic, atraumatic  Eyes: EOMI, no conjunctival erythema  ENMT: No pharyngeal erythema, MMM, no rhinorrhea  Neck: Supple, no elevated JVP, no carotid bruits  Lungs: Clear to auscultation bilaterally. No wheezes, rales, or rhonchi.   Cardiac: Regular rate and rhythm, +S1S2, no murmurs apparent  Abdomen: Soft, nontender, +bowel sounds  Extremities: Moves all extremities x 4, no lower extremity edema  Neurologic: No focal motor deficits apparent, normal mood and affect    Laboratory Tests:  Lab Results   Component Value Date    CREATININE 0.6 06/09/2021    BUN 8 06/09/2021     06/09/2021    K 3.8 06/09/2021     06/09/2021    CO2 24 06/09/2021     Lab Results Component Value Date    MG 2.4 02/16/2020     Lab Results   Component Value Date    ALT 17 06/09/2021    AST 22 06/09/2021    ALKPHOS 94 06/09/2021    BILITOT 0.4 06/09/2021     Lab Results   Component Value Date    WBC 6.8 06/09/2021    HGB 12.7 06/09/2021    HCT 39.4 06/09/2021    MCV 84.4 06/09/2021     06/09/2021     Lab Results   Component Value Date    CKTOTAL 101 02/05/2014    CKMB 2.8 02/05/2014    TROPONINI <0.01 02/15/2020    TROPONINI <0.01 02/15/2020    TROPONINI <0.01 02/15/2020     Lab Results   Component Value Date    INR 0.9 02/15/2020    INR 1.0 01/31/2020    INR 1.1 02/05/2014    PROTIME 11.2 02/15/2020    PROTIME 12.4 01/31/2020    PROTIME 12.5 02/05/2014     Lab Results   Component Value Date    CHOL 163 02/16/2020    CHOL 221 (H) 02/01/2020    CHOL 314 (H) 10/12/2018     Lab Results   Component Value Date    TRIG 92 02/16/2020    TRIG 155 (H) 02/01/2020    TRIG 320 (H) 10/12/2018     Lab Results   Component Value Date    HDL 38 06/09/2021    HDL 47 02/16/2020    HDL 34 02/01/2020     Lab Results   Component Value Date    LDLCALC 92 06/09/2021    LDLCALC 98 02/16/2020    LDLCALC 156 (H) 02/01/2020     Lab Results   Component Value Date    LABVLDL 30 06/09/2021    LABVLDL 18 02/16/2020    LABVLDL 31 02/01/2020     Cardiac Tests:  ECG (5/24/22): SR, rate 62, PRWP, NSSTT changes    ECG (2/10/2021): SR, rate 65, PRWP, leftward axis, NSSTT changes    Echocardiogram: 2/1/2020 (Dr. Ryan Choudhary)   Ejection fraction is visually estimated at 65%. No regional wall motion abnormalities seen. Moderate left ventricular concentric hypertrophy noted. Normal left ventricular diastolic filling pattern for age. Normal right ventricle structure and function. Physiologic and/or trace mitral regurgitation is present. No evidence for hemodynamically significant pericardial effusion. CXR: 2/15/2020  No acute cardiopulmonary disease process is identified.     CTA chest: 2/15/2020  No PE or aortic dissection.       No acute pathology of the chest. Current examination is unchanged from prior    examination. Exercise nuclear stress test: 3/10/21 (Dr. Sandie Mathew)  1. Exercise EKG was negative. 2. The patient experienced no chest pain with exercise. 3. The myocardial perfusion imaging was abnormal.    The abnormality was a a small sized completely reversible defect in the distal anterolateral wall  4. Overall left ventricular systolic function was normal without regional wall motion abnormalities. 5. Dockery treadmill score was 6 implying low risk. 6. Exercise capacity was average. 7. There was an appropriate heart rate and blood pressure response to exercise and recovery. 8. Low risk general exercise treadmill test.    The patient exercised using a Geraldo protocol, completing 6:00 minutes and reaching an estimated work load of 7.0 metabolic equivalents (METS). Resting HR was 62. Peak exercise heart rate was 142 ( 86% of maximum predicted heart rate for age). Baseline /76. Peak exercise /78. Gated SPECT left ventricular ejection fraction was calculated to be 73%, with normal myocardial thickening and wall motion. TID 0.98    ASSESSMENT / PLAN:  1. \"Profound\" shortness of breath after starting brilinta in 1/2020 --> brilinta switched to effient on 2/15/2020 --> SOB resolved. 2. CAD / inferior STEMI s/p MICHELLE x 2 on 1/25/2020 (distal RCA and proximal RCA) and s/p MICHELLE x 1 to mid LAD on 1/28/2020. Moderate LCx disease. S/p cardiac rehab.  3. HTN -- BP today 140/84  4. HLD  5. BMI 32.6 --> 34.2 --> 35.3 --> 32.2 --> 33.5  6. GERD -- on PPI  7. Pemphigus vulgaris  8.  Chest pain / \"acid reflux\"    - Will add imdur 30 mg daily  - Results of 3/2021 exercise nuclear stress test reviewed with the patient today  - Stephany Owens stopped and Effient (with loading dose) started on 2/15/2020 --> duration of DAPT discussed  - Continue ASA  - Continue high-intensity statin  - Continue Toprol XL  - Aggressive risk factor modifications    Greater than 30 minutes was spent counseling the patient, reviewing the rationale for the above recommendations and reviewing the patient's current medication list, problem list and results of all previously ordered testing.     Latanya Stafford MD  [facility]) Cardiology

## 2022-11-11 ENCOUNTER — HOSPITAL ENCOUNTER (EMERGENCY)
Age: 57
Discharge: LEFT BEFORE DISCHARGE | End: 2022-11-11
Attending: EMERGENCY MEDICINE
Payer: MEDICAID

## 2022-11-11 ENCOUNTER — APPOINTMENT (OUTPATIENT)
Dept: GENERAL RADIOLOGY | Age: 57
End: 2022-11-11
Payer: MEDICAID

## 2022-11-11 VITALS
OXYGEN SATURATION: 99 % | DIASTOLIC BLOOD PRESSURE: 77 MMHG | TEMPERATURE: 98.7 F | HEART RATE: 83 BPM | RESPIRATION RATE: 16 BRPM | HEIGHT: 64 IN | WEIGHT: 190 LBS | BODY MASS INDEX: 32.44 KG/M2 | SYSTOLIC BLOOD PRESSURE: 163 MMHG

## 2022-11-11 DIAGNOSIS — M25.511 ACUTE PAIN OF RIGHT SHOULDER: Primary | ICD-10-CM

## 2022-11-11 PROCEDURE — 99282 EMERGENCY DEPT VISIT SF MDM: CPT

## 2022-11-11 ASSESSMENT — PAIN DESCRIPTION - LOCATION: LOCATION: SHOULDER

## 2022-11-11 ASSESSMENT — PAIN DESCRIPTION - ORIENTATION: ORIENTATION: RIGHT

## 2022-11-11 ASSESSMENT — PAIN SCALES - GENERAL: PAINLEVEL_OUTOF10: 7

## 2022-11-11 NOTE — ED PROVIDER NOTES
Shanell Mosley is a 62 y.o. female presenting to the ED for right shoulder pain , beginning tuesday ago. The complaint has been intermittent, moderate in severity, and worsened by nothing. 63 yo f who notes she was in the gym Monday and doing lifting and notes on Tuesday she has been having right shoulder pain worse with movement pronation and supination and sever ewith lifting. Pain is 7/10 pain radiates into her mid upper arm. Pt has taken percocet tylenol and ibuprofen which helped. Pt is here she states for an xray. Pt has seen dr Elías Queen and his pa mj tello for orthopaedics. Pt denies any numbness or weakness, neck or back pain, cp or sob. Review of Systems:   Pertinent positives and negatives are stated within HPI, all other systems reviewed and are negative.          --------------------------------------------- PAST HISTORY ---------------------------------------------  Past Medical History:  has a past medical history of DVT (deep venous thrombosis) ([facility] [address] Hyperlipidemia, Hypertension, and Myocardial infarct ([facility] [address] Past Surgical History:  has a past surgical history that includes  section; Hysterectomy; Foot surgery (Left); Colonoscopy; Upper gastrointestinal endoscopy; and Upper gastrointestinal endoscopy (N/A, 2019). Social History:  reports that she has never smoked. She has never used smokeless tobacco. She reports current alcohol use of about 1.0 standard drink per week. She reports that she does not use drugs. Family History: family history includes Breast Cancer in her mother; Stroke (age of onset: 61) in her father. The patients home medications have been reviewed. Allergies: Patient has no known allergies.     -------------------------------------------------- RESULTS -------------------------------------------------  All laboratory and radiology results have been personally reviewed by myself   LABS:  No results found for this visit on 11/11/22. RADIOLOGY:  Interpreted by Radiologist.  No orders to display       ------------------------- NURSING NOTES AND VITALS REVIEWED ---------------------------   The nursing notes within the ED encounter and vital signs as below have been reviewed. BP (!) 163/77   Pulse 83   Temp 98.7 °F (37.1 °C) (Oral)   Resp 16   Ht 5' 4\" (1.626 m)   Wt 190 lb (86.2 kg)   SpO2 99%   BMI 32.61 kg/m²   Oxygen Saturation Interpretation: Normal      ---------------------------------------------------PHYSICAL EXAM--------------------------------------    Physical Exam  Vitals reviewed. Constitutional:       General: She is not in acute distress. Appearance: Normal appearance. She is not toxic-appearing. HENT:      Head: Normocephalic and atraumatic. Right Ear: External ear normal.      Left Ear: External ear normal.      Nose: Nose normal. No congestion. Mouth/Throat:      Mouth: Mucous membranes are moist.      Pharynx: Oropharynx is clear. No posterior oropharyngeal erythema. Eyes:      Extraocular Movements: Extraocular movements intact. Pupils: Pupils are equal, round, and reactive to light. Cardiovascular:      Rate and Rhythm: Normal rate and regular rhythm. Pulses: Normal pulses. Heart sounds: No murmur heard. Pulmonary:      Effort: Pulmonary effort is normal.      Breath sounds: No wheezing or rhonchi. Chest:      Chest wall: No tenderness. Abdominal:      General: Bowel sounds are normal.      Tenderness: There is no abdominal tenderness. There is no right CVA tenderness, left CVA tenderness or guarding. Musculoskeletal:         General: Tenderness present. No swelling or deformity. Right shoulder: Tenderness present. No swelling, effusion, bony tenderness or crepitus. Decreased range of motion. Normal strength. Normal pulse. Left shoulder: Normal.        Arms:       Cervical back: Normal range of motion and neck supple. No muscular tenderness.    Skin: General: Skin is warm and dry. Capillary Refill: Capillary refill takes less than 2 seconds. Neurological:      General: No focal deficit present. Mental Status: She is alert and oriented to person, place, and time. Motor: No weakness. Psychiatric:         Mood and Affect: Mood normal.               ------------------------------ ED COURSE/MEDICAL DECISION MAKING----------------------  Medications - No data to display      ED COURSE:       Medical Decision Making:    Scented with right shoulder pain. Differential is joint effusion which was not found on exam, shoulder sprain rotator cuff injury, ligamentous injury, infection which was not evident on exam, glenoid tear, osteoarthritis flare. Patient's range of motion was limited due to pain. It was worse with supination and pronation. X-ray was ordered ice was given to the patient. Recommended oral pain medicine while patient was here she declined. Patient ended up having to leave [place] due to a family member being in a car accident. This patient has chosen to leave against medical advice. I have personally explained to them that choosing to do so may result in permanent bodily harm or death. I discussed at length that without further evaluation and monitoring there may be unforeseen circumstances and deterioration resulting in permanent bodily harm or death as a result of their choice. They are alert, oriented, and competent at this time. They state that they are aware of the serious risks as explained, but they continue to wish to leave against medical advice. In light of their decision to leave against medical advice, follow-up has been recommended and they are aware of the importance of following up as instructed. They have been advised that they should return to the ED immediately if they change their mind at any time, or if their condition begins to change or worsen. Counseling:    The emergency provider has spoken with the patient and discussed todays results, in addition to providing specific details for the plan of care and counseling regarding the diagnosis and prognosis. Questions are answered at this time and they are agreeable with the plan.      --------------------------------- IMPRESSION AND DISPOSITION ---------------------------------    IMPRESSION  1. Acute pain of right shoulder        DISPOSITION  Disposition: Other Disposition: Left AMA  Patient condition is fair      NOTE: This report was transcribed using voice recognition software.  Every effort was made to ensure accuracy; however, inadvertent computerized transcription errors may be present       Carmen Mcmahan DO  11/11/22 1044

## 2023-03-12 DIAGNOSIS — I25.2 HISTORY OF ST ELEVATION MYOCARDIAL INFARCTION (STEMI): ICD-10-CM

## 2023-03-12 DIAGNOSIS — I25.10 CORONARY ARTERY DISEASE INVOLVING NATIVE CORONARY ARTERY OF NATIVE HEART WITHOUT ANGINA PECTORIS: Chronic | ICD-10-CM

## 2023-03-13 RX ORDER — ATORVASTATIN CALCIUM 80 MG/1
TABLET, FILM COATED ORAL
Qty: 90 TABLET | Refills: 3 | Status: SHIPPED | OUTPATIENT
Start: 2023-03-13

## 2023-03-23 ENCOUNTER — APPOINTMENT (OUTPATIENT)
Dept: GENERAL RADIOLOGY | Age: 58
End: 2023-03-23
Payer: MEDICAID

## 2023-03-23 ENCOUNTER — HOSPITAL ENCOUNTER (EMERGENCY)
Age: 58
Discharge: HOME | End: 2023-03-23
Attending: EMERGENCY MEDICINE
Payer: MEDICAID

## 2023-03-23 VITALS
RESPIRATION RATE: 16 BRPM | BODY MASS INDEX: 32.44 KG/M2 | WEIGHT: 190 LBS | OXYGEN SATURATION: 95 % | HEIGHT: 64 IN | TEMPERATURE: 98.7 F | DIASTOLIC BLOOD PRESSURE: 85 MMHG | SYSTOLIC BLOOD PRESSURE: 136 MMHG | HEART RATE: 88 BPM

## 2023-03-23 DIAGNOSIS — J06.9 ACUTE UPPER RESPIRATORY INFECTION: ICD-10-CM

## 2023-03-23 DIAGNOSIS — U07.1 COVID-19: Primary | ICD-10-CM

## 2023-03-23 LAB
INFLUENZA A: NOT DETECTED
INFLUENZA B: NOT DETECTED
SARS-COV-2 RNA, RT PCR: DETECTED
STREP GRP A PCR: NEGATIVE

## 2023-03-23 PROCEDURE — 99284 EMERGENCY DEPT VISIT MOD MDM: CPT

## 2023-03-23 PROCEDURE — 87636 SARSCOV2 & INF A&B AMP PRB: CPT

## 2023-03-23 PROCEDURE — 87880 STREP A ASSAY W/OPTIC: CPT

## 2023-03-23 PROCEDURE — 71046 X-RAY EXAM CHEST 2 VIEWS: CPT

## 2023-03-23 RX ORDER — ALBUTEROL SULFATE 90 UG/1
2 AEROSOL, METERED RESPIRATORY (INHALATION) 4 TIMES DAILY PRN
Qty: 18 G | Refills: 0 | Status: SHIPPED | OUTPATIENT
Start: 2023-03-23 | End: 2023-03-23 | Stop reason: SDUPTHER

## 2023-03-23 RX ORDER — AZELASTINE 1 MG/ML
1 SPRAY, METERED NASAL 2 TIMES DAILY
Qty: 60 ML | Refills: 1 | Status: SHIPPED | OUTPATIENT
Start: 2023-03-23

## 2023-03-23 RX ORDER — ALBUTEROL SULFATE 90 UG/1
2 AEROSOL, METERED RESPIRATORY (INHALATION) 4 TIMES DAILY PRN
Qty: 18 G | Refills: 0 | Status: SHIPPED | OUTPATIENT
Start: 2023-03-23

## 2023-03-23 RX ORDER — AMOXICILLIN AND CLAVULANATE POTASSIUM 875; 125 MG/1; MG/1
1 TABLET, FILM COATED ORAL 2 TIMES DAILY
Qty: 20 TABLET | Refills: 0 | Status: SHIPPED | OUTPATIENT
Start: 2023-03-23 | End: 2023-03-23 | Stop reason: ALTCHOICE

## 2023-03-23 RX ORDER — AMOXICILLIN AND CLAVULANATE POTASSIUM 875; 125 MG/1; MG/1
1 TABLET, FILM COATED ORAL 2 TIMES DAILY
Qty: 20 TABLET | Refills: 0 | Status: SHIPPED | OUTPATIENT
Start: 2023-03-23 | End: 2023-03-23 | Stop reason: SDUPTHER

## 2023-03-23 ASSESSMENT — PAIN - FUNCTIONAL ASSESSMENT
PAIN_FUNCTIONAL_ASSESSMENT: 0-10

## 2023-03-23 ASSESSMENT — PAIN SCALES - GENERAL
PAINLEVEL_OUTOF10: 6
PAINLEVEL_OUTOF10: 7
PAINLEVEL_OUTOF10: 9

## 2023-03-23 ASSESSMENT — ENCOUNTER SYMPTOMS
ABDOMINAL PAIN: 0
SHORTNESS OF BREATH: 0
SORE THROAT: 1
VOICE CHANGE: 0
NAUSEA: 0
COUGH: 1
SINUS PRESSURE: 1
VOMITING: 0
SINUS PAIN: 1
EYE REDNESS: 0
RHINORRHEA: 1
TROUBLE SWALLOWING: 0

## 2023-03-23 ASSESSMENT — PAIN DESCRIPTION - DESCRIPTORS
DESCRIPTORS: THROBBING
DESCRIPTORS: OTHER (COMMENT)

## 2023-03-23 ASSESSMENT — PAIN DESCRIPTION - PAIN TYPE
TYPE: ACUTE PAIN
TYPE: ACUTE PAIN

## 2023-03-23 ASSESSMENT — PAIN DESCRIPTION - LOCATION
LOCATION: HEAD;THROAT;EAR
LOCATION: THROAT;EAR;HEAD
LOCATION: THROAT;HEAD;EAR

## 2023-03-23 NOTE — ED PROVIDER NOTES
[address] ENCOUNTER        Pt Name: Lizzie Bueno  MRN: [id number]  [place] 1965  Date of evaluation: 3/23/2023  Provider: Raman Hinds DO  PCP: Fátima Arce MD  Note Started: 8:31 AM EDT 3/23/23    CHIEF COMPLAINT       Chief Complaint   Patient presents with    Influenza     Yesterday I couldn't swallow, I have a headache, sore throat, ears feel full, cough and runny nose - initially started on Tuesday. Last fever was yesterday 101 at home. Did not have a flu shot nor covid booster. HISTORY OF PRESENT ILLNESS: 1 or more Elements       Lizzie Bueno is a 62 y.o. female who presents to department with a chief complaint of nasal congestion, facial pain and cough. The patient states her symptoms began 2 days ago. She does have pain located in bilateral maxillary sinuses. Described as pressure. Nothing makes it better. Nothing makes it worse. The patient states that she has tried multiple over-the-counter medications with no symptom relief. She does also admit to a cough which is producing green sputum. The patient does also complain of a sore throat. She is able to handle her secretions without difficulty. She denies any chest pain or shortness of breath. Denies any fevers, chills nausea or abdominal pain. Nursing Notes were all reviewed and agreed with or any disagreements were addressed in the HPI. REVIEW OF SYSTEMS :      Review of Systems   Constitutional:  Negative for chills and fatigue. HENT:  Positive for congestion, postnasal drip, rhinorrhea, sinus pressure, sinus pain, sneezing and sore throat. Negative for drooling, ear pain, trouble swallowing and voice change. Eyes:  Negative for redness. Respiratory:  Positive for cough. Negative for shortness of breath. Cardiovascular:  Negative for chest pain. Gastrointestinal:  Negative for abdominal pain, nausea and vomiting. Screen Group A Throat    Specimen: Throat   Result Value Ref Range    Strep Grp A PCR Negative Negative   COVID-19 & Influenza Combo    Specimen: Nasopharyngeal Swab   Result Value Ref Range    SARS-CoV-2 RNA, RT PCR DETECTED (A) NOT DETECTED    INFLUENZA A NOT DETECTED NOT DETECTED    INFLUENZA B NOT DETECTED NOT DETECTED       RADIOLOGY:  Interpreted by Radiologist.  XR CHEST (2 VW)   Final Result   No acute process. RADIOLOGY:   Non-plain film images such as CT, Ultrasound and MRI are read by the radiologist. Plain radiographic images are visualized and preliminarily interpreted by the ED Provider       Interpretation per the Radiologist below, if available at the time of this note:    XR CHEST (2 VW)   Final Result   No acute process. No results found. No results found. PROCEDURES   Unless otherwise noted below, none       MDM:   IDr. Gris am the primary physician of record. Declan Burton is a 62 y.o. female who presents to the ED for nasal congestion and cough  Vital signs upon arrival /84   Pulse (!) 107 Comment: ambulatory HR  Temp 98.7 °F (37.1 °C) (Oral)   Resp 18   Ht 5' 4\" (1.626 m)   Wt 190 lb (86.2 kg)   SpO2 96%   BMI 32.61 kg/m²     History From: The patient patient's medical record    Independent Historian: None    Limitations to history: None      History: The patient presents to department with a chief complaint of nasal congestion, facial pain and cough. The patient states her symptoms began 2 days ago. She does have pain located in bilateral maxillary sinuses. Described as pressure. Nothing makes it better. Nothing makes it worse. The patient states that she has tried multiple over-the-counter medications with no symptom relief. She does also admit to a cough which is producing green sputum. The patient does also complain of a sore throat. She is able to handle her secretions without difficulty.   She denies any chest pain or shortness

## 2023-03-23 NOTE — DISCHARGE INSTRUCTIONS
Obtain a pulse oximeter and make sure your pulse ox stays greater than 90%. If it goes lower than 90% return to the emergency department.

## 2023-03-23 NOTE — LETTER
[address] [facility] Emergency Department  [address] [facility] [address]  Phone: [phone]               March 23, 2023    Patient: Aquilino Marroquin   YOB: 1965   Date of Visit: 3/23/2023       To Whom It May Concern:    Jeannie Wong was seen and treated in our emergency department on 3/23/2023. She may return to work on 3/26/2023.       Sincerely,       Governor CARLOS Whitehead         Signature:__________________________________

## 2023-03-23 NOTE — ED TRIAGE NOTES
This is a 62year old  americal female that presents with c/o runny nose, headache, sore throat, cough, and fever that started on Tuesday morning. 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

## 2023-03-23 NOTE — LETTER
[address] [facility] Emergency Department  [address] [place] [address]  Phone: [phone]               March 23, 2023    Patient: Annie Cole   YOB: 1965   Date of Visit: 3/23/2023       To Whom It May Concern:    Sly Alas was seen and treated in our emergency department on 3/23/2023. She may return to work on 3/26/2023.       Sincerely,       Maria Dolores Denson RN         Signature:__________________________________

## 2023-05-05 ENCOUNTER — HOSPITAL ENCOUNTER (EMERGENCY)
Age: 58
Discharge: HOME | End: 2023-05-05
Payer: MEDICAID

## 2023-05-05 VITALS
TEMPERATURE: 98.5 F | SYSTOLIC BLOOD PRESSURE: 123 MMHG | BODY MASS INDEX: 32.61 KG/M2 | OXYGEN SATURATION: 98 % | HEART RATE: 63 BPM | WEIGHT: 190 LBS | RESPIRATION RATE: 20 BRPM | DIASTOLIC BLOOD PRESSURE: 76 MMHG

## 2023-05-05 DIAGNOSIS — H60.392 INFECTIVE OTITIS EXTERNA OF LEFT EAR: Primary | ICD-10-CM

## 2023-05-05 DIAGNOSIS — H66.002 NON-RECURRENT ACUTE SUPPURATIVE OTITIS MEDIA OF LEFT EAR WITHOUT SPONTANEOUS RUPTURE OF TYMPANIC MEMBRANE: ICD-10-CM

## 2023-05-05 DIAGNOSIS — H61.23 BILATERAL IMPACTED CERUMEN: ICD-10-CM

## 2023-05-05 PROCEDURE — 99283 EMERGENCY DEPT VISIT LOW MDM: CPT

## 2023-05-05 RX ORDER — AMOXICILLIN 875 MG/1
875 TABLET, COATED ORAL 2 TIMES DAILY
Qty: 20 TABLET | Refills: 0 | Status: SHIPPED | OUTPATIENT
Start: 2023-05-05 | End: 2023-05-15

## 2023-05-05 RX ORDER — OFLOXACIN 3 MG/ML
5 SOLUTION AURICULAR (OTIC) 2 TIMES DAILY
Qty: 10 ML | Refills: 0 | Status: SHIPPED | OUTPATIENT
Start: 2023-05-05 | End: 2023-05-15

## 2023-05-05 ASSESSMENT — PAIN DESCRIPTION - LOCATION
LOCATION: EAR
LOCATION: EAR

## 2023-05-05 ASSESSMENT — PAIN DESCRIPTION - DESCRIPTORS
DESCRIPTORS: DISCOMFORT;SORE;ACHING
DESCRIPTORS: ACHING

## 2023-05-05 ASSESSMENT — LIFESTYLE VARIABLES
HOW MANY STANDARD DRINKS CONTAINING ALCOHOL DO YOU HAVE ON A TYPICAL DAY: PATIENT DOES NOT DRINK
HOW OFTEN DO YOU HAVE A DRINK CONTAINING ALCOHOL: NEVER

## 2023-05-05 ASSESSMENT — PAIN SCALES - GENERAL
PAINLEVEL_OUTOF10: 7
PAINLEVEL_OUTOF10: 7

## 2023-05-05 ASSESSMENT — PAIN - FUNCTIONAL ASSESSMENT
PAIN_FUNCTIONAL_ASSESSMENT: 0-10
PAIN_FUNCTIONAL_ASSESSMENT: 0-10
PAIN_FUNCTIONAL_ASSESSMENT: PREVENTS OR INTERFERES SOME ACTIVE ACTIVITIES AND ADLS

## 2023-05-05 ASSESSMENT — PAIN DESCRIPTION - PAIN TYPE
TYPE: ACUTE PAIN
TYPE: ACUTE PAIN

## 2023-05-05 ASSESSMENT — PAIN DESCRIPTION - FREQUENCY
FREQUENCY: CONTINUOUS
FREQUENCY: CONTINUOUS

## 2023-05-05 ASSESSMENT — PAIN DESCRIPTION - ORIENTATION
ORIENTATION: LEFT
ORIENTATION: LEFT

## 2023-05-05 NOTE — ED NOTES
Pt given discharge summary with education on left infective otitis externa, bilateral impacted cerumen.   Pt also aware to follow up with her primary care physician and to  her prescriptions at her assigned pharmacy     Leslie Woodard RN  05/05/23 42 Aditi Duran RN  05/05/23 [id number]

## 2023-05-05 NOTE — ED PROVIDER NOTES
Independent [facility] Visit. [address] [facility]  Department of Emergency Medicine   ED  Encounter Note  Admit Date/RoomTime: 2023  3:36 PM  ED Room:     NAME: Lazara Perez  : 1965  MRN: [id number]     Chief Complaint:  Otalgia (Left ear feels clogged up and pain)    History of Present Illness        Lazara Perez is a 62 y.o. old female who presenting to the emergency department with complaint of gradual onset left ear pain with a clogged sensation. Patient states that she uses Q-tips in her ear frequently but has not been able to get anything out. She states that she has a plugged sensation to the left ear with intermittent sharp pains. Denies any fevers, no drainage from the ear, no mastoid tenderness, does not have pain when she moves her ear. Rates her pain as a 7 out of 10 with no exacerbating or remitting factors. ROS   Pertinent positives and negatives are stated within HPI, all other systems reviewed and are negative. Past Medical History:  has a past medical history of DVT (deep venous thrombosis) ([address] Hyperlipidemia, Hypertension, and Myocardial infarct ([facility] [address] Surgical History:  has a past surgical history that includes  section; Hysterectomy; Foot surgery (Left); Colonoscopy; Upper gastrointestinal endoscopy; and Upper gastrointestinal endoscopy (N/A, 2019). Social History:  reports that she has never smoked. She has never used smokeless tobacco. She reports that she does not currently use alcohol after a past usage of about 1.0 standard drink per week. She reports that she does not use drugs. Family History: family history includes Breast Cancer in her mother; Stroke (age of onset: 61) in her father. Allergies: Patient has no known allergies.     Physical Exam   Oxygen Saturation Interpretation: Normal.        ED Triage Vitals   BP Temp Temp Source Pulse Respirations SpO2 Height Weight - Scale   23 1524 23 1524 23

## 2023-05-05 NOTE — DISCHARGE INSTRUCTIONS
Amoxicillin twice daily for the next 10 days. You will start the Floxin eardrops 5 drops in each ear twice daily for the next 5 to 7 days, you may then start the Debrox eardrops twice weekly to help keep your ears clear of wax.

## 2023-08-04 ENCOUNTER — HOSPITAL ENCOUNTER (EMERGENCY)
Age: 58
Discharge: HOME | End: 2023-08-04
Attending: EMERGENCY MEDICINE
Payer: COMMERCIAL

## 2023-08-04 VITALS
OXYGEN SATURATION: 97 % | SYSTOLIC BLOOD PRESSURE: 135 MMHG | DIASTOLIC BLOOD PRESSURE: 86 MMHG | RESPIRATION RATE: 16 BRPM | WEIGHT: 200 LBS | TEMPERATURE: 98.5 F | HEART RATE: 85 BPM | BODY MASS INDEX: 34.33 KG/M2

## 2023-08-04 DIAGNOSIS — J02.9 ACUTE VIRAL PHARYNGITIS: Primary | ICD-10-CM

## 2023-08-04 DIAGNOSIS — B34.9 ACUTE VIRAL SYNDROME: ICD-10-CM

## 2023-08-04 LAB
SPECIMEN SOURCE: NORMAL
STREP A, MOLECULAR: NEGATIVE

## 2023-08-04 PROCEDURE — 96372 THER/PROPH/DIAG INJ SC/IM: CPT

## 2023-08-04 PROCEDURE — 6360000002 HC RX W HCPCS: Performed by: EMERGENCY MEDICINE

## 2023-08-04 PROCEDURE — 6370000000 HC RX 637 (ALT 250 FOR IP): Performed by: EMERGENCY MEDICINE

## 2023-08-04 PROCEDURE — 99284 EMERGENCY DEPT VISIT MOD MDM: CPT

## 2023-08-04 RX ORDER — ECHINACEA PURPUREA EXTRACT 125 MG
1 TABLET ORAL PRN
Qty: 1 EACH | Refills: 3 | Status: SHIPPED | OUTPATIENT
Start: 2023-08-04

## 2023-08-04 RX ORDER — OXYMETAZOLINE HYDROCHLORIDE 0.05 G/100ML
2 SPRAY NASAL 2 TIMES DAILY PRN
Qty: 15 ML | Refills: 0 | Status: SHIPPED | OUTPATIENT
Start: 2023-08-04 | End: 2023-08-09

## 2023-08-04 RX ORDER — KETOROLAC TROMETHAMINE 30 MG/ML
30 INJECTION, SOLUTION INTRAMUSCULAR; INTRAVENOUS ONCE
Status: COMPLETED | OUTPATIENT
Start: 2023-08-04 | End: 2023-08-04

## 2023-08-04 RX ORDER — ACETAMINOPHEN 500 MG
1000 TABLET ORAL ONCE
Status: COMPLETED | OUTPATIENT
Start: 2023-08-04 | End: 2023-08-04

## 2023-08-04 RX ADMIN — KETOROLAC TROMETHAMINE 30 MG: 30 INJECTION, SOLUTION INTRAMUSCULAR; INTRAVENOUS at 08:16

## 2023-08-04 RX ADMIN — ACETAMINOPHEN 1000 MG: 500 TABLET ORAL at 08:15

## 2023-08-04 ASSESSMENT — PAIN DESCRIPTION - ORIENTATION: ORIENTATION: MID

## 2023-08-04 ASSESSMENT — PAIN - FUNCTIONAL ASSESSMENT: PAIN_FUNCTIONAL_ASSESSMENT: 0-10

## 2023-08-04 ASSESSMENT — PAIN DESCRIPTION - DESCRIPTORS: DESCRIPTORS: SORE;ACHING;DISCOMFORT

## 2023-08-04 ASSESSMENT — PAIN SCALES - GENERAL: PAINLEVEL_OUTOF10: 10

## 2023-08-04 ASSESSMENT — PAIN DESCRIPTION - LOCATION: LOCATION: THROAT

## 2023-08-04 ASSESSMENT — PAIN DESCRIPTION - PAIN TYPE: TYPE: ACUTE PAIN

## 2023-08-04 NOTE — ED PROVIDER NOTES
ED PROVIDER NOTE    Chief Complaint   Patient presents with    Pharyngitis     Sore throat that started Tuesday. HPI:  23,   Time: 8:05 AM EDT       Kris Gallardo is a 62 y.o. female presenting to the ED for sore throat and URI symptoms. Gradual onset 3 days ago, progressively worsening, moderate in severity. Sore throat worse with swallowing. No dysphagia, drooling, stridor. Associated headache, body aches, nasal congestion, postnasal drip. Abdominal cramping and diarrhea as well. Headache not maximal at onset, not on anticoagulants, no associated neck stiffness. Has had fever to 103F at home. No known sick contacts. Has not taken any medications for her symptoms. No associated cough, chest pain, shortness of breath, nausea, vomiting. Tolerating p.o. fluids. Normal urine output.     Chart review: hx of HTN, HLD, CAD s/p stent    Reviewed outpatient cardiology note from 2022 by Dr. Newman :  Hx of CAD, inferior STEMI s/p stent    Review of Systems:     Review of Systems  Pertinent positives and negatives as stated in HPI     --------------------------------------------- PAST HISTORY ---------------------------------------------  Past Medical History:   Past Medical History:   Diagnosis Date    DVT (deep venous thrombosis) ([address])     right leg    Hyperlipidemia     Hypertension     Myocardial infarct ([address]) 2020       Past Surgical History:   Past Surgical History:   Procedure Laterality Date     SECTION      COLONOSCOPY      FOOT SURGERY Left     HYSTERECTOMY (CERVIX STATUS UNKNOWN)      partial hysterectomy    UPPER GASTROINTESTINAL ENDOSCOPY      UPPER GASTROINTESTINAL ENDOSCOPY N/A 2019    EGD BIOPSY performed by Jeanine Luu MD at [facility] ENDOSCOPY       Social History:   Social History     Socioeconomic History    Marital status: Single   Tobacco Use    Smoking status: Never    Smokeless tobacco: Never   Vaping Use    Vaping Use: Never used   Substance and Sexual

## 2023-10-31 ENCOUNTER — HOSPITAL ENCOUNTER (OUTPATIENT)
Dept: MRI IMAGING | Age: 58
Discharge: HOME | End: 2023-11-02
Attending: PODIATRIST
Payer: COMMERCIAL

## 2023-10-31 DIAGNOSIS — M25.572 LEFT ANKLE PAIN, UNSPECIFIED CHRONICITY: ICD-10-CM

## 2023-10-31 DIAGNOSIS — M25.572 PAIN IN LEFT ANKLE AND JOINTS OF LEFT FOOT: ICD-10-CM

## 2023-10-31 PROCEDURE — 73721 MRI JNT OF LWR EXTRE W/O DYE: CPT

## 2024-07-17 ENCOUNTER — OFFICE VISIT (OUTPATIENT)
Dept: CARDIOLOGY CLINIC | Age: 59
End: 2024-07-17
Payer: COMMERCIAL

## 2024-07-17 VITALS
WEIGHT: 202.8 LBS | SYSTOLIC BLOOD PRESSURE: 136 MMHG | BODY MASS INDEX: 35.93 KG/M2 | HEIGHT: 63 IN | RESPIRATION RATE: 16 BRPM | HEART RATE: 60 BPM | DIASTOLIC BLOOD PRESSURE: 80 MMHG

## 2024-07-17 DIAGNOSIS — I25.10 CORONARY ARTERY DISEASE INVOLVING NATIVE CORONARY ARTERY OF NATIVE HEART WITHOUT ANGINA PECTORIS: Primary | ICD-10-CM

## 2024-07-17 DIAGNOSIS — E78.2 MIXED HYPERLIPIDEMIA: ICD-10-CM

## 2024-07-17 DIAGNOSIS — I10 PRIMARY HYPERTENSION: ICD-10-CM

## 2024-07-17 DIAGNOSIS — E66.9 OBESITY (BMI 30-39.9): Chronic | ICD-10-CM

## 2024-07-17 DIAGNOSIS — I25.2 HISTORY OF ST ELEVATION MYOCARDIAL INFARCTION (STEMI): ICD-10-CM

## 2024-07-17 DIAGNOSIS — R07.2 PRECORDIAL PAIN: ICD-10-CM

## 2024-07-17 PROCEDURE — 3079F DIAST BP 80-89 MM HG: CPT | Performed by: INTERNAL MEDICINE

## 2024-07-17 PROCEDURE — 99214 OFFICE O/P EST MOD 30 MIN: CPT | Performed by: INTERNAL MEDICINE

## 2024-07-17 PROCEDURE — 3075F SYST BP GE 130 - 139MM HG: CPT | Performed by: INTERNAL MEDICINE

## 2024-07-17 PROCEDURE — 93000 ELECTROCARDIOGRAM COMPLETE: CPT | Performed by: INTERNAL MEDICINE

## 2024-07-17 RX ORDER — POTASSIUM CHLORIDE 20 MEQ/1
20 TABLET, EXTENDED RELEASE ORAL DAILY
COMMUNITY
Start: 2024-04-29

## 2024-07-17 NOTE — PROGRESS NOTES
OUTPATIENT CARDIOLOGY FOLLOW-UP    Name: Katia Santana    Age: 59 y.o.    Date of Service: 2024    Chief Complaint: CAD, history of STEMI, SOB    Referring Physician: Leonardo Martinez MD    History of Present Illness:  Katia Santana is a 59 y.o. female who presented to [facility] on 2/15/2020 for further evaluation of progressive SOB. Her PMH is outlined in detail below (see A/P below), and includes inferior STEMI s/p staged PCI in 2020 ([employer] where patient works; see cath results below). She denies recent exertional chest pain, palpitations, or syncope. No history of PND or orthopnea. +\"profound\" shortness of breath at rest and with exertion after Brilinta started in 2020 (no improvement for ~ 3 weeks leading up to 2020 hospitalization). Brilinta switched to effient on 2/15/2020 --> SOB resolved. At the time of her 2022 office visit, she reported intermittent \"indigestion\" for > 1 year (non-exertional, episodes last minutes, mid-sternal) --> imdur added at 2022 office visit (patient is no longer taking medication). Still with similar chest pain. She is currently with no active cardiac complaints at rest. SR on EKG today.    Review of Systems:   Cardiac: As per HPI  General: No fever, chills  Pulmonary: As per HPI  HEENT: No visual disturbances, difficult swallowing  GI: No nausea, vomiting  : No dysuria, hematuria  Endocrine: No thyroid disease or DM  Musculoskeletal: GIRON x 4, no focal motor deficits  Skin: Intact, no rashes  Neuro: No headache, seizures  Psych: Currently with no depression, anxiety    Past Medical History:  Past Medical History:   Diagnosis Date    DVT ([facility] thrombosis) ([facility] 2019    right leg    Hyperlipidemia     Hypertension     Myocardial infarct ([facility]) 2020       Past Surgical History:  Past Surgical History:   Procedure Laterality Date     SECTION      COLONOSCOPY      FOOT SURGERY Left     HYSTERECTOMY (CERVIX STATUS UNKNOWN)      partial

## 2024-07-31 ENCOUNTER — TELEPHONE (OUTPATIENT)
Dept: CARDIOLOGY | Age: 59
End: 2024-07-31

## 2024-07-31 NOTE — TELEPHONE ENCOUNTER
Spoke w/ patient to confirm stress test for Friday, 8/2/24, at 0700.  Instructions given and medications reviewed.  Patient instructed to hold Toprol XL for 24 hours prior to test and no caffeine products for 12 hours prior to test.  All questions answered.

## 2024-08-02 ENCOUNTER — HOSPITAL ENCOUNTER (OUTPATIENT)
Dept: CARDIOLOGY | Age: 59
Discharge: HOME | End: 2024-08-02
Attending: INTERNAL MEDICINE
Payer: COMMERCIAL

## 2024-08-02 VITALS
HEART RATE: 68 BPM | HEIGHT: 63 IN | DIASTOLIC BLOOD PRESSURE: 80 MMHG | SYSTOLIC BLOOD PRESSURE: 132 MMHG | BODY MASS INDEX: 35.79 KG/M2 | RESPIRATION RATE: 18 BRPM | WEIGHT: 202 LBS

## 2024-08-02 DIAGNOSIS — R07.2 PRECORDIAL PAIN: Primary | ICD-10-CM

## 2024-08-02 LAB
ECHO BSA: 2.02 M2
NUC STRESS EJECTION FRACTION: 68 %
STRESS ANGINA INDEX: 0
STRESS BASELINE DIAS BP: 80 MMHG
STRESS BASELINE HR: 62 BPM
STRESS BASELINE SYS BP: 132 MMHG
STRESS ESTIMATED WORKLOAD: 10 METS
STRESS EXERCISE DUR MIN: 7 MIN
STRESS EXERCISE DUR SEC: 0 SEC
STRESS O2 SAT PEAK: 97 %
STRESS O2 SAT REST: 98 %
STRESS PEAK DIAS BP: 90 MMHG
STRESS PEAK SYS BP: 164 MMHG
STRESS PERCENT HR ACHIEVED: 93 %
STRESS POST PEAK HR: 150 BPM
STRESS RATE PRESSURE PRODUCT: NORMAL BPM*MMHG
STRESS TARGET HR: 161 BPM

## 2024-08-02 PROCEDURE — 3430000000 HC RX DIAGNOSTIC RADIOPHARMACEUTICAL: Performed by: INTERNAL MEDICINE

## 2024-08-02 PROCEDURE — A9500 TC99M SESTAMIBI: HCPCS | Performed by: INTERNAL MEDICINE

## 2024-08-02 PROCEDURE — 93017 CV STRESS TEST TRACING ONLY: CPT

## 2024-08-02 PROCEDURE — 78452 HT MUSCLE IMAGE SPECT MULT: CPT | Performed by: INTERNAL MEDICINE

## 2024-08-02 PROCEDURE — 93018 CV STRESS TEST I&R ONLY: CPT | Performed by: INTERNAL MEDICINE

## 2024-08-02 PROCEDURE — 93016 CV STRESS TEST SUPVJ ONLY: CPT | Performed by: INTERNAL MEDICINE

## 2024-08-02 PROCEDURE — 2580000003 HC RX 258: Performed by: INTERNAL MEDICINE

## 2024-08-02 RX ORDER — SODIUM CHLORIDE 0.9 % (FLUSH) 0.9 %
10 SYRINGE (ML) INJECTION PRN
Status: ACTIVE | OUTPATIENT
Start: 2024-08-02

## 2024-08-02 RX ORDER — TETRAKIS(2-METHOXYISOBUTYLISOCYANIDE)COPPER(I) TETRAFLUOROBORATE 1 MG/ML
10.1 INJECTION, POWDER, LYOPHILIZED, FOR SOLUTION INTRAVENOUS
Status: COMPLETED | OUTPATIENT
Start: 2024-08-02 | End: 2024-08-02

## 2024-08-02 RX ORDER — TETRAKIS(2-METHOXYISOBUTYLISOCYANIDE)COPPER(I) TETRAFLUOROBORATE 1 MG/ML
33.3 INJECTION, POWDER, LYOPHILIZED, FOR SOLUTION INTRAVENOUS
Status: COMPLETED | OUTPATIENT
Start: 2024-08-02 | End: 2024-08-02

## 2024-08-02 RX ADMIN — SODIUM CHLORIDE, PRESERVATIVE FREE 10 ML: 5 INJECTION INTRAVENOUS at 08:40

## 2024-08-02 RX ADMIN — SODIUM CHLORIDE, PRESERVATIVE FREE 10 ML: 5 INJECTION INTRAVENOUS at 07:24

## 2024-08-02 RX ADMIN — Medication 10.1 MILLICURIE: at 07:24

## 2024-08-02 RX ADMIN — Medication 33.3 MILLICURIE: at 08:40

## 2024-08-05 ENCOUNTER — TELEPHONE (OUTPATIENT)
Dept: CARDIOLOGY CLINIC | Age: 59
End: 2024-08-05

## 2024-08-05 NOTE — TELEPHONE ENCOUNTER
----- Message from Bhavesh Luciano MD sent at 8/3/2024  3:51 PM EDT -----  Negative stress test and normal EF

## 2025-03-17 ENCOUNTER — OFFICE VISIT (OUTPATIENT)
Dept: PRIMARY CARE CLINIC | Age: 60
End: 2025-03-17
Payer: COMMERCIAL

## 2025-03-17 VITALS
SYSTOLIC BLOOD PRESSURE: 128 MMHG | TEMPERATURE: 97 F | WEIGHT: 207 LBS | HEIGHT: 63 IN | OXYGEN SATURATION: 99 % | HEART RATE: 65 BPM | BODY MASS INDEX: 36.68 KG/M2 | DIASTOLIC BLOOD PRESSURE: 82 MMHG

## 2025-03-17 DIAGNOSIS — I25.10 CORONARY ARTERY DISEASE INVOLVING NATIVE CORONARY ARTERY OF NATIVE HEART WITHOUT ANGINA PECTORIS: Chronic | ICD-10-CM

## 2025-03-17 DIAGNOSIS — L10.0 PEMPHIGUS VULGARIS (HCC): Primary | ICD-10-CM

## 2025-03-17 DIAGNOSIS — K21.9 GASTROESOPHAGEAL REFLUX DISEASE, UNSPECIFIED WHETHER ESOPHAGITIS PRESENT: ICD-10-CM

## 2025-03-17 DIAGNOSIS — I25.2 HISTORY OF ST ELEVATION MYOCARDIAL INFARCTION (STEMI): ICD-10-CM

## 2025-03-17 DIAGNOSIS — Z79.891 LONG TERM (CURRENT) USE OF OPIATE ANALGESIC: ICD-10-CM

## 2025-03-17 DIAGNOSIS — E55.9 VITAMIN D DEFICIENCY: ICD-10-CM

## 2025-03-17 DIAGNOSIS — I10 PRIMARY HYPERTENSION: ICD-10-CM

## 2025-03-17 DIAGNOSIS — Z12.31 BREAST CANCER SCREENING BY MAMMOGRAM: ICD-10-CM

## 2025-03-17 DIAGNOSIS — E66.9 OBESITY (BMI 30-39.9): Chronic | ICD-10-CM

## 2025-03-17 DIAGNOSIS — L10.0 PEMPHIGUS VULGARIS (HCC): ICD-10-CM

## 2025-03-17 DIAGNOSIS — E78.2 MIXED HYPERLIPIDEMIA: ICD-10-CM

## 2025-03-17 DIAGNOSIS — M81.0 AGE-RELATED OSTEOPOROSIS WITHOUT CURRENT PATHOLOGICAL FRACTURE: ICD-10-CM

## 2025-03-17 DIAGNOSIS — R73.03 PREDIABETES: ICD-10-CM

## 2025-03-17 PROBLEM — N95.2 ATROPHIC VAGINITIS: Status: ACTIVE | Noted: 2020-06-25

## 2025-03-17 PROBLEM — E78.5 HYPERLIPIDEMIA: Status: ACTIVE | Noted: 2025-03-17

## 2025-03-17 PROBLEM — I21.9 MYOCARDIAL INFARCTION (HCC): Status: RESOLVED | Noted: 2020-02-01 | Resolved: 2025-03-17

## 2025-03-17 PROBLEM — I21.9 MYOCARDIAL INFARCTION (HCC): Status: ACTIVE | Noted: 2020-02-01

## 2025-03-17 LAB
ALBUMIN: 4 G/DL (ref 3.5–5.2)
ALP BLD-CCNC: 97 U/L (ref 35–104)
ALT SERPL-CCNC: 10 U/L (ref 0–32)
ANION GAP SERPL CALCULATED.3IONS-SCNC: 16 MMOL/L (ref 7–16)
AST SERPL-CCNC: 23 U/L (ref 0–31)
BACTERIA: ABNORMAL
BASOPHILS ABSOLUTE: 0.03 K/UL (ref 0–0.2)
BASOPHILS RELATIVE PERCENT: 1 % (ref 0–2)
BILIRUB SERPL-MCNC: 0.5 MG/DL (ref 0–1.2)
BILIRUBIN DIRECT: <0.2 MG/DL (ref 0–0.3)
BILIRUBIN, INDIRECT: NORMAL MG/DL (ref 0–1)
BILIRUBIN, URINE: NEGATIVE
BUN BLDV-MCNC: 10 MG/DL (ref 6–20)
CALCIUM SERPL-MCNC: 9.5 MG/DL (ref 8.6–10.2)
CHLORIDE BLD-SCNC: 108 MMOL/L (ref 98–107)
CHOLESTEROL, TOTAL: 137 MG/DL
CO2: 19 MMOL/L (ref 22–29)
COLOR, UA: YELLOW
CREAT SERPL-MCNC: 0.7 MG/DL (ref 0.5–1)
CREATININE URINE: 162.7 MG/DL (ref 29–226)
CRYSTALS, UA: ABNORMAL /HPF
EOSINOPHILS ABSOLUTE: 0.07 K/UL (ref 0.05–0.5)
EOSINOPHILS RELATIVE PERCENT: 1 % (ref 0–6)
EPITHELIAL CELLS, UA: ABNORMAL /HPF
GFR, ESTIMATED: >90 ML/MIN/1.73M2
GLUCOSE BLD-MCNC: 85 MG/DL (ref 74–99)
GLUCOSE URINE: NEGATIVE MG/DL
HCT VFR BLD CALC: 41.5 % (ref 34–48)
HDLC SERPL-MCNC: 36 MG/DL
HEMOGLOBIN: 12.8 G/DL (ref 11.5–15.5)
IMMATURE GRANULOCYTES %: 0 % (ref 0–5)
IMMATURE GRANULOCYTES ABSOLUTE: <0.03 K/UL (ref 0–0.58)
KETONES, URINE: NEGATIVE MG/DL
LDL CHOLESTEROL: 84 MG/DL
LEUKOCYTE ESTERASE, URINE: NEGATIVE
LYMPHOCYTES ABSOLUTE: 1.4 K/UL (ref 1.5–4)
LYMPHOCYTES RELATIVE PERCENT: 26 % (ref 20–42)
MCH RBC QN AUTO: 27.5 PG (ref 26–35)
MCHC RBC AUTO-ENTMCNC: 30.8 G/DL (ref 32–34.5)
MCV RBC AUTO: 89.2 FL (ref 80–99.9)
MICROALBUMIN/CREAT 24H UR: <12 MG/L (ref 0–19)
MICROALBUMIN/CREAT UR-RTO: NORMAL MCG/MG CREAT (ref 0–30)
MONOCYTES ABSOLUTE: 0.49 K/UL (ref 0.1–0.95)
MONOCYTES RELATIVE PERCENT: 9 % (ref 2–12)
NEUTROPHILS ABSOLUTE: 3.35 K/UL (ref 1.8–7.3)
NEUTROPHILS RELATIVE PERCENT: 63 % (ref 43–80)
NITRITE, URINE: NEGATIVE
PDW BLD-RTO: 13.7 % (ref 11.5–15)
PH, URINE: 6 (ref 5–8)
PLATELET # BLD: 276 K/UL (ref 130–450)
PMV BLD AUTO: 10.2 FL (ref 7–12)
POTASSIUM SERPL-SCNC: 4.6 MMOL/L (ref 3.5–5)
PROTEIN UA: NEGATIVE MG/DL
RBC # BLD: 4.65 M/UL (ref 3.5–5.5)
RBC UA: ABNORMAL /HPF
SODIUM BLD-SCNC: 143 MMOL/L (ref 132–146)
SPECIFIC GRAVITY UA: 1.02 (ref 1–1.03)
T4 FREE: 0.8 NG/DL (ref 0.9–1.7)
TOTAL PROTEIN: 7.7 G/DL (ref 6.4–8.3)
TRIGL SERPL-MCNC: 83 MG/DL
TSH SERPL DL<=0.05 MIU/L-ACNC: 1.16 UIU/ML (ref 0.27–4.2)
TURBIDITY: CLEAR
URIC ACID: 3.7 MG/DL (ref 2.4–5.7)
URINE HGB: NEGATIVE
UROBILINOGEN, URINE: 0.2 EU/DL (ref 0–1)
VLDLC SERPL CALC-MCNC: 17 MG/DL
WBC # BLD: 5.4 K/UL (ref 4.5–11.5)
WBC UA: ABNORMAL /HPF

## 2025-03-17 PROCEDURE — 3074F SYST BP LT 130 MM HG: CPT | Performed by: FAMILY MEDICINE

## 2025-03-17 PROCEDURE — 99204 OFFICE O/P NEW MOD 45 MIN: CPT | Performed by: FAMILY MEDICINE

## 2025-03-17 PROCEDURE — G8417 CALC BMI ABV UP PARAM F/U: HCPCS | Performed by: FAMILY MEDICINE

## 2025-03-17 PROCEDURE — 1036F TOBACCO NON-USER: CPT | Performed by: FAMILY MEDICINE

## 2025-03-17 PROCEDURE — 3017F COLORECTAL CA SCREEN DOC REV: CPT | Performed by: FAMILY MEDICINE

## 2025-03-17 PROCEDURE — G8427 DOCREV CUR MEDS BY ELIG CLIN: HCPCS | Performed by: FAMILY MEDICINE

## 2025-03-17 PROCEDURE — 3079F DIAST BP 80-89 MM HG: CPT | Performed by: FAMILY MEDICINE

## 2025-03-17 RX ORDER — VENLAFAXINE HYDROCHLORIDE 37.5 MG/1
37.5 CAPSULE, EXTENDED RELEASE ORAL DAILY
Qty: 30 CAPSULE | Refills: 5 | Status: SHIPPED | OUTPATIENT
Start: 2025-03-17

## 2025-03-17 RX ORDER — AMLODIPINE BESYLATE 5 MG/1
5 TABLET ORAL DAILY
Qty: 30 TABLET | Refills: 5 | Status: SHIPPED
Start: 2025-03-17 | End: 2025-03-17 | Stop reason: SDUPTHER

## 2025-03-17 RX ORDER — EZETIMIBE 10 MG/1
10 TABLET ORAL DAILY
Qty: 30 TABLET | Refills: 5 | Status: SHIPPED | OUTPATIENT
Start: 2025-03-17

## 2025-03-17 RX ORDER — ATORVASTATIN CALCIUM 80 MG/1
80 TABLET, FILM COATED ORAL NIGHTLY
Qty: 90 TABLET | Refills: 3 | Status: SHIPPED | OUTPATIENT
Start: 2025-03-17

## 2025-03-17 RX ORDER — AZATHIOPRINE 50 MG/1
50 TABLET ORAL DAILY
Qty: 30 TABLET | Refills: 5 | Status: SHIPPED | OUTPATIENT
Start: 2025-03-17

## 2025-03-17 RX ORDER — VENLAFAXINE HYDROCHLORIDE 37.5 MG/1
37.5 CAPSULE, EXTENDED RELEASE ORAL DAILY
COMMUNITY
End: 2025-03-17 | Stop reason: SDUPTHER

## 2025-03-17 RX ORDER — AZATHIOPRINE 50 MG/1
50 TABLET ORAL DAILY
Qty: 30 TABLET | Refills: 5 | Status: SHIPPED
Start: 2025-03-17 | End: 2025-03-17 | Stop reason: SDUPTHER

## 2025-03-17 RX ORDER — OXYCODONE AND ACETAMINOPHEN 7.5; 325 MG/1; MG/1
1 TABLET ORAL EVERY 12 HOURS PRN
Qty: 60 TABLET | Refills: 0 | Status: SHIPPED | OUTPATIENT
Start: 2025-03-17 | End: 2025-04-16

## 2025-03-17 RX ORDER — POTASSIUM CHLORIDE 1500 MG/1
20 TABLET, EXTENDED RELEASE ORAL DAILY
Qty: 60 TABLET | Refills: 5 | Status: SHIPPED | OUTPATIENT
Start: 2025-03-17

## 2025-03-17 RX ORDER — AMLODIPINE BESYLATE 5 MG/1
5 TABLET ORAL DAILY
COMMUNITY
End: 2025-03-17 | Stop reason: SDUPTHER

## 2025-03-17 RX ORDER — PREDNISONE 10 MG/1
TABLET ORAL
Qty: 30 TABLET | Refills: 5 | Status: SHIPPED
Start: 2025-03-17 | End: 2025-03-17 | Stop reason: SDUPTHER

## 2025-03-17 RX ORDER — AMLODIPINE BESYLATE 5 MG/1
5 TABLET ORAL DAILY
Qty: 30 TABLET | Refills: 5 | Status: SHIPPED | OUTPATIENT
Start: 2025-03-17

## 2025-03-17 RX ORDER — POTASSIUM CHLORIDE 1500 MG/1
20 TABLET, EXTENDED RELEASE ORAL DAILY
Qty: 60 TABLET | Refills: 5 | Status: SHIPPED
Start: 2025-03-17 | End: 2025-03-17 | Stop reason: SDUPTHER

## 2025-03-17 RX ORDER — EZETIMIBE 10 MG/1
10 TABLET ORAL DAILY
COMMUNITY
Start: 2025-03-11 | End: 2025-03-17 | Stop reason: SDUPTHER

## 2025-03-17 RX ORDER — EZETIMIBE 10 MG/1
10 TABLET ORAL DAILY
Qty: 30 TABLET | Refills: 5 | Status: SHIPPED
Start: 2025-03-17 | End: 2025-03-17 | Stop reason: SDUPTHER

## 2025-03-17 RX ORDER — PREDNISONE 10 MG/1
TABLET ORAL
Qty: 30 TABLET | Refills: 5 | Status: SHIPPED | OUTPATIENT
Start: 2025-03-17

## 2025-03-17 RX ORDER — ATORVASTATIN CALCIUM 80 MG/1
80 TABLET, FILM COATED ORAL NIGHTLY
Qty: 90 TABLET | Refills: 3 | Status: SHIPPED
Start: 2025-03-17 | End: 2025-03-17 | Stop reason: SDUPTHER

## 2025-03-17 RX ORDER — VENLAFAXINE HYDROCHLORIDE 37.5 MG/1
37.5 CAPSULE, EXTENDED RELEASE ORAL DAILY
Qty: 30 CAPSULE | Refills: 5 | Status: SHIPPED
Start: 2025-03-17 | End: 2025-03-17 | Stop reason: SDUPTHER

## 2025-03-17 RX ORDER — OXYCODONE AND ACETAMINOPHEN 7.5; 325 MG/1; MG/1
1 TABLET ORAL EVERY 12 HOURS PRN
Qty: 60 TABLET | Refills: 0 | Status: SHIPPED
Start: 2025-03-17 | End: 2025-03-17 | Stop reason: SDUPTHER

## 2025-03-17 SDOH — ECONOMIC STABILITY: FOOD INSECURITY: WITHIN THE PAST 12 MONTHS, THE FOOD YOU BOUGHT JUST DIDN'T LAST AND YOU DIDN'T HAVE MONEY TO GET MORE.: NEVER TRUE

## 2025-03-17 SDOH — ECONOMIC STABILITY: FOOD INSECURITY: WITHIN THE PAST 12 MONTHS, YOU WORRIED THAT YOUR FOOD WOULD RUN OUT BEFORE YOU GOT MONEY TO BUY MORE.: NEVER TRUE

## 2025-03-17 ASSESSMENT — ENCOUNTER SYMPTOMS
BLOOD IN STOOL: 0
NAUSEA: 0
BACK PAIN: 0
SORE THROAT: 0
SHORTNESS OF BREATH: 0
DIARRHEA: 0
ABDOMINAL PAIN: 0
VOMITING: 0
COUGH: 0
CHEST TIGHTNESS: 1
PHOTOPHOBIA: 0
CONSTIPATION: 0

## 2025-03-17 ASSESSMENT — PATIENT HEALTH QUESTIONNAIRE - PHQ9
2. FEELING DOWN, DEPRESSED OR HOPELESS: NOT AT ALL
SUM OF ALL RESPONSES TO PHQ QUESTIONS 1-9: 0
1. LITTLE INTEREST OR PLEASURE IN DOING THINGS: NOT AT ALL
SUM OF ALL RESPONSES TO PHQ QUESTIONS 1-9: 0

## 2025-03-17 NOTE — ASSESSMENT & PLAN NOTE
Continue to work toward weight loss    Orders:    CBC with Auto Differential; Future    T4, Free; Future    Uric Acid; Future    TSH; Future    Hepatic Function Panel; Future    Basic Metabolic Panel; Future    Lipid Panel; Future    Hemoglobin A1C; Future    Urinalysis with Microscopic; Future    Albumin/Creatinine Ratio, Urine; Future    PAIN MANAGEMENT PROFILE 1 W/ CONFIRMATION, URINE; Future

## 2025-03-17 NOTE — ASSESSMENT & PLAN NOTE
Currently stable with azathioprine and prednisone with Percocet as needed for breakthrough.  Will continue to monitor symptoms and may refer to specialist if symptoms worsen.  State monitoring program reviewed with any signs of abuse or diversion.  It is refilled today.    Orders:    CBC with Auto Differential; Future    T4, Free; Future    Uric Acid; Future    TSH; Future    Hepatic Function Panel; Future    Basic Metabolic Panel; Future    Lipid Panel; Future    Hemoglobin A1C; Future    Urinalysis with Microscopic; Future    Albumin/Creatinine Ratio, Urine; Future    PAIN MANAGEMENT PROFILE 1 W/ CONFIRMATION, URINE; Future    predniSONE (DELTASONE) 10 MG tablet; 5 mg MWF    oxyCODONE-acetaminophen (PERCOCET) 7.5-325 MG per tablet; Take 1 tablet by mouth every 12 hours as needed for Pain for up to 30 days. Max Daily Amount: 2 tablets    azaTHIOprine (IMURAN) 50 MG tablet; Take 1 tablet by mouth daily

## 2025-03-17 NOTE — ASSESSMENT & PLAN NOTE
Medications were refilled due to with food.  Will recheck labs today.    Orders:    CBC with Auto Differential; Future    T4, Free; Future    Uric Acid; Future    TSH; Future    Hepatic Function Panel; Future    Basic Metabolic Panel; Future    Lipid Panel; Future    Hemoglobin A1C; Future    Urinalysis with Microscopic; Future    Albumin/Creatinine Ratio, Urine; Future    PAIN MANAGEMENT PROFILE 1 W/ CONFIRMATION, URINE; Future    atorvastatin (LIPITOR) 80 MG tablet; Take 1 tablet by mouth nightly    ezetimibe (ZETIA) 10 MG tablet; Take 1 tablet by mouth daily

## 2025-03-17 NOTE — ASSESSMENT & PLAN NOTE
Will recheck labs    Orders:    CBC with Auto Differential; Future    T4, Free; Future    Uric Acid; Future    TSH; Future    Hepatic Function Panel; Future    Basic Metabolic Panel; Future    Lipid Panel; Future    Hemoglobin A1C; Future    Urinalysis with Microscopic; Future    Albumin/Creatinine Ratio, Urine; Future    PAIN MANAGEMENT PROFILE 1 W/ CONFIRMATION, URINE; Future

## 2025-03-17 NOTE — ASSESSMENT & PLAN NOTE
Currently stable.  Follows with cardiology in May of this year.    Orders:    CBC with Auto Differential; Future    T4, Free; Future    Uric Acid; Future    TSH; Future    Hepatic Function Panel; Future    Basic Metabolic Panel; Future    Lipid Panel; Future    Hemoglobin A1C; Future    Urinalysis with Microscopic; Future    Albumin/Creatinine Ratio, Urine; Future    PAIN MANAGEMENT PROFILE 1 W/ CONFIRMATION, URINE; Future    atorvastatin (LIPITOR) 80 MG tablet; Take 1 tablet by mouth nightly

## 2025-03-17 NOTE — ASSESSMENT & PLAN NOTE
Currently under JNC 8 guidelines and asymptomatic.    Orders:    CBC with Auto Differential; Future    T4, Free; Future    Uric Acid; Future    TSH; Future    Hepatic Function Panel; Future    Basic Metabolic Panel; Future    Lipid Panel; Future    Hemoglobin A1C; Future    Urinalysis with Microscopic; Future    Albumin/Creatinine Ratio, Urine; Future    PAIN MANAGEMENT PROFILE 1 W/ CONFIRMATION, URINE; Future    amLODIPine (NORVASC) 5 MG tablet; Take 1 tablet by mouth daily

## 2025-03-17 NOTE — TELEPHONE ENCOUNTER
Medication was sent to [facility], but they are not contracted with patients insurance. Medications needing sent to [facility] in [place]. Meds pended

## 2025-03-17 NOTE — PROGRESS NOTES
and oriented to person, place, and time.      Cranial Nerves: No cranial nerve deficit.   Psychiatric:         Judgment: Judgment normal.                  An electronic signature was used to authenticate this note.    --Noe Cox, DO

## 2025-03-17 NOTE — ASSESSMENT & PLAN NOTE
Currently stable    Orders:    CBC with Auto Differential; Future    T4, Free; Future    Uric Acid; Future    TSH; Future    Hepatic Function Panel; Future    Basic Metabolic Panel; Future    Lipid Panel; Future    Hemoglobin A1C; Future    Urinalysis with Microscopic; Future    Albumin/Creatinine Ratio, Urine; Future    PAIN MANAGEMENT PROFILE 1 W/ CONFIRMATION, URINE; Future

## 2025-03-18 ENCOUNTER — TELEPHONE (OUTPATIENT)
Dept: CARDIOLOGY CLINIC | Age: 60
End: 2025-03-18

## 2025-03-18 ENCOUNTER — RESULTS FOLLOW-UP (OUTPATIENT)
Dept: PRIMARY CARE CLINIC | Age: 60
End: 2025-03-18

## 2025-03-18 LAB — HBA1C MFR BLD: 6.3 % (ref 4–5.6)

## 2025-03-18 NOTE — TELEPHONE ENCOUNTER
Patient needs cardiac clearance for a foot surgery scheduled on 4/3 with . patient states that she has been having chest pain but denies any SOB or palpitations, please advise

## 2025-03-20 ENCOUNTER — HOSPITAL ENCOUNTER (OUTPATIENT)
Dept: MAMMOGRAPHY | Age: 60
Discharge: HOME | End: 2025-03-22
Attending: FAMILY MEDICINE
Payer: COMMERCIAL

## 2025-03-20 VITALS — WEIGHT: 200 LBS | HEIGHT: 65 IN | BODY MASS INDEX: 33.32 KG/M2

## 2025-03-20 DIAGNOSIS — Z12.31 BREAST CANCER SCREENING BY MAMMOGRAM: ICD-10-CM

## 2025-03-20 DIAGNOSIS — M81.0 AGE-RELATED OSTEOPOROSIS WITHOUT CURRENT PATHOLOGICAL FRACTURE: ICD-10-CM

## 2025-03-20 LAB — MAMMOGRAPHY, EXTERNAL: NORMAL

## 2025-03-20 PROCEDURE — 77080 DXA BONE DENSITY AXIAL: CPT

## 2025-03-20 PROCEDURE — 77063 BREAST TOMOSYNTHESIS BI: CPT

## 2025-03-28 ENCOUNTER — RESULTS FOLLOW-UP (OUTPATIENT)
Dept: FAMILY MEDICINE CLINIC | Age: 60
End: 2025-03-28

## 2025-03-31 ENCOUNTER — OFFICE VISIT (OUTPATIENT)
Dept: PRIMARY CARE CLINIC | Age: 60
End: 2025-03-31
Payer: COMMERCIAL

## 2025-03-31 VITALS
WEIGHT: 205 LBS | OXYGEN SATURATION: 98 % | SYSTOLIC BLOOD PRESSURE: 130 MMHG | BODY MASS INDEX: 34.16 KG/M2 | DIASTOLIC BLOOD PRESSURE: 78 MMHG | HEART RATE: 78 BPM | HEIGHT: 65 IN | TEMPERATURE: 97.2 F

## 2025-03-31 DIAGNOSIS — N20.0 KIDNEY STONE: Primary | ICD-10-CM

## 2025-03-31 PROCEDURE — G8427 DOCREV CUR MEDS BY ELIG CLIN: HCPCS | Performed by: FAMILY MEDICINE

## 2025-03-31 PROCEDURE — 3075F SYST BP GE 130 - 139MM HG: CPT | Performed by: FAMILY MEDICINE

## 2025-03-31 PROCEDURE — 1036F TOBACCO NON-USER: CPT | Performed by: FAMILY MEDICINE

## 2025-03-31 PROCEDURE — 3078F DIAST BP <80 MM HG: CPT | Performed by: FAMILY MEDICINE

## 2025-03-31 PROCEDURE — 99213 OFFICE O/P EST LOW 20 MIN: CPT | Performed by: FAMILY MEDICINE

## 2025-03-31 PROCEDURE — G8417 CALC BMI ABV UP PARAM F/U: HCPCS | Performed by: FAMILY MEDICINE

## 2025-03-31 PROCEDURE — 3017F COLORECTAL CA SCREEN DOC REV: CPT | Performed by: FAMILY MEDICINE

## 2025-03-31 ASSESSMENT — ENCOUNTER SYMPTOMS
SHORTNESS OF BREATH: 0
PHOTOPHOBIA: 0
CONSTIPATION: 0
VOMITING: 0
CHEST TIGHTNESS: 1
NAUSEA: 0
BLOOD IN STOOL: 0
BACK PAIN: 0
DIARRHEA: 0
COUGH: 0
SORE THROAT: 0
ABDOMINAL PAIN: 0

## 2025-03-31 NOTE — PROGRESS NOTES
Katia Santana (:  1965) is a 59 y.o. female,New patient, here for evaluation of the following chief complaint(s):  Results (Recent labs)         Assessment & Plan  Kidney stone  Initial review of x-ray shows no acute radiopaque stone.  Patient currently asymptomatic.  Will give her dietary modifications for the prediabetes and the potential kidney stone.  See her back as scheduled.        Orders:    XR ABDOMEN (KUB) (SINGLE AP VIEW); Future      No follow-ups on file.       Subjective   HPI  Patient presents today to establish with new PCP.  Previous PCP recently passed away.  Past medical history significant for pemphigus vulgaris mostly in the oral cavity, hypertension, previous STEMI/CAD, hyperlipidemia, GERD, vitamin D deficiency, and longstanding prednisone use.  Patient states that she has been doing well for the most part.  Did have a recent flareup of unknown etiology.  Continues with pain medication as needed in addition to  dosing of the steroid.  Just needs to be updated on her chronic issues and for surveillance.  No other issues at this time.    Update 3/31/2025  Patient presents today to follow-up on chronic issues and for discussion of lab results.  Prediabetic from long-term steroid use.  Urine showed potential for kidney stone however she is asymptomatic with only intermittent back pain.  No noticed hematuria.  No fever or chills.  No nausea or vomiting.  Pemphigus continues to fluctuate.      Review of Systems   Constitutional:  Negative for chills and fever.   HENT:  Positive for mouth sores. Negative for congestion, hearing loss, nosebleeds and sore throat.    Eyes:  Negative for photophobia.   Respiratory:  Positive for chest tightness. Negative for cough and shortness of breath.    Cardiovascular:  Negative for chest pain, palpitations and leg swelling.   Gastrointestinal:  Negative for abdominal pain, blood in stool, constipation, diarrhea, nausea and

## 2025-04-07 ENCOUNTER — RESULTS FOLLOW-UP (OUTPATIENT)
Dept: FAMILY MEDICINE CLINIC | Age: 60
End: 2025-04-07

## 2025-04-24 DIAGNOSIS — L10.0 PEMPHIGUS VULGARIS (HCC): ICD-10-CM

## 2025-04-24 RX ORDER — OXYCODONE AND ACETAMINOPHEN 7.5; 325 MG/1; MG/1
1 TABLET ORAL EVERY 12 HOURS PRN
Qty: 60 TABLET | Refills: 0 | Status: SHIPPED | OUTPATIENT
Start: 2025-04-24 | End: 2025-05-24

## 2025-04-24 NOTE — TELEPHONE ENCOUNTER
Last Appointment:  3/31/2025  Future Appointments   Date Time Provider Department Center   6/16/2025  9:30 AM Noe Cox DO [facility]   7/25/2025  8:00 AM Bhavesh Luciano MD [facility]

## 2025-05-12 DIAGNOSIS — I25.10 CORONARY ARTERY DISEASE INVOLVING NATIVE CORONARY ARTERY OF NATIVE HEART WITHOUT ANGINA PECTORIS: Chronic | ICD-10-CM

## 2025-05-12 DIAGNOSIS — I25.2 HISTORY OF ST ELEVATION MYOCARDIAL INFARCTION (STEMI): ICD-10-CM

## 2025-05-13 RX ORDER — METOPROLOL SUCCINATE 25 MG/1
25 TABLET, EXTENDED RELEASE ORAL DAILY
Qty: 30 TABLET | Refills: 5 | Status: SHIPPED | OUTPATIENT
Start: 2025-05-13

## 2025-05-19 ENCOUNTER — TELEPHONE (OUTPATIENT)
Dept: CARDIOLOGY CLINIC | Age: 60
End: 2025-05-19

## 2025-05-19 NOTE — TELEPHONE ENCOUNTER
According to last office visit patient is to continue toprol 25mg daily however she states a few months ago (not sure how long) pcp increased to 100mg daily which she has been taking, she states she feels fine but want clarification on dose, please advise

## 2025-05-21 RX ORDER — METOPROLOL SUCCINATE 100 MG/1
100 TABLET, EXTENDED RELEASE ORAL DAILY
COMMUNITY
End: 2025-05-21 | Stop reason: SDUPTHER

## 2025-05-21 RX ORDER — METOPROLOL SUCCINATE 100 MG/1
100 TABLET, EXTENDED RELEASE ORAL DAILY
Qty: 90 TABLET | Refills: 1 | Status: SHIPPED | OUTPATIENT
Start: 2025-05-21

## 2025-05-27 DIAGNOSIS — I25.10 CORONARY ARTERY DISEASE INVOLVING NATIVE CORONARY ARTERY OF NATIVE HEART WITHOUT ANGINA PECTORIS: Chronic | ICD-10-CM

## 2025-05-27 DIAGNOSIS — I25.2 HISTORY OF ST ELEVATION MYOCARDIAL INFARCTION (STEMI): ICD-10-CM

## 2025-05-27 RX ORDER — PRASUGREL 10 MG/1
10 TABLET, FILM COATED ORAL DAILY
Qty: 90 TABLET | Refills: 3 | Status: SHIPPED | OUTPATIENT
Start: 2025-05-27

## 2025-06-16 ENCOUNTER — OFFICE VISIT (OUTPATIENT)
Dept: PRIMARY CARE CLINIC | Age: 60
End: 2025-06-16
Payer: COMMERCIAL

## 2025-06-16 VITALS
OXYGEN SATURATION: 96 % | BODY MASS INDEX: 33.66 KG/M2 | SYSTOLIC BLOOD PRESSURE: 130 MMHG | DIASTOLIC BLOOD PRESSURE: 86 MMHG | WEIGHT: 202 LBS | HEART RATE: 78 BPM | TEMPERATURE: 97 F | HEIGHT: 65 IN

## 2025-06-16 DIAGNOSIS — K21.9 GASTROESOPHAGEAL REFLUX DISEASE, UNSPECIFIED WHETHER ESOPHAGITIS PRESENT: ICD-10-CM

## 2025-06-16 DIAGNOSIS — L10.0 PEMPHIGUS VULGARIS (HCC): Primary | ICD-10-CM

## 2025-06-16 DIAGNOSIS — Z79.891 LONG TERM (CURRENT) USE OF OPIATE ANALGESIC: ICD-10-CM

## 2025-06-16 DIAGNOSIS — Z12.12 SCREENING FOR MALIGNANT NEOPLASM OF THE RECTUM: ICD-10-CM

## 2025-06-16 DIAGNOSIS — R73.03 PREDIABETES: ICD-10-CM

## 2025-06-16 DIAGNOSIS — I10 PRIMARY HYPERTENSION: ICD-10-CM

## 2025-06-16 DIAGNOSIS — Z00.00 INITIAL MEDICARE ANNUAL WELLNESS VISIT: ICD-10-CM

## 2025-06-16 DIAGNOSIS — E55.9 VITAMIN D DEFICIENCY: ICD-10-CM

## 2025-06-16 DIAGNOSIS — E78.2 MIXED HYPERLIPIDEMIA: ICD-10-CM

## 2025-06-16 PROCEDURE — G0439 PPPS, SUBSEQ VISIT: HCPCS | Performed by: FAMILY MEDICINE

## 2025-06-16 PROCEDURE — 3017F COLORECTAL CA SCREEN DOC REV: CPT | Performed by: FAMILY MEDICINE

## 2025-06-16 PROCEDURE — 3079F DIAST BP 80-89 MM HG: CPT | Performed by: FAMILY MEDICINE

## 2025-06-16 PROCEDURE — 3075F SYST BP GE 130 - 139MM HG: CPT | Performed by: FAMILY MEDICINE

## 2025-06-16 RX ORDER — OXYCODONE AND ACETAMINOPHEN 7.5; 325 MG/1; MG/1
1 TABLET ORAL 2 TIMES DAILY
Qty: 60 TABLET | Refills: 0 | Status: SHIPPED | OUTPATIENT
Start: 2025-06-16 | End: 2025-07-16

## 2025-06-16 RX ORDER — SUCRALFATE 1 G/1
1 TABLET ORAL 3 TIMES DAILY
Qty: 90 TABLET | Refills: 3 | Status: SHIPPED | OUTPATIENT
Start: 2025-06-16

## 2025-06-16 RX ORDER — PANTOPRAZOLE SODIUM 40 MG/1
40 TABLET, DELAYED RELEASE ORAL
Qty: 30 TABLET | Refills: 5 | Status: SHIPPED | OUTPATIENT
Start: 2025-06-16

## 2025-06-16 ASSESSMENT — PATIENT HEALTH QUESTIONNAIRE - PHQ9
SUM OF ALL RESPONSES TO PHQ QUESTIONS 1-9: 0
SUM OF ALL RESPONSES TO PHQ QUESTIONS 1-9: 0
1. LITTLE INTEREST OR PLEASURE IN DOING THINGS: NOT AT ALL
SUM OF ALL RESPONSES TO PHQ QUESTIONS 1-9: 0
2. FEELING DOWN, DEPRESSED OR HOPELESS: NOT AT ALL
SUM OF ALL RESPONSES TO PHQ QUESTIONS 1-9: 0

## 2025-06-16 ASSESSMENT — LIFESTYLE VARIABLES
HOW OFTEN DO YOU HAVE A DRINK CONTAINING ALCOHOL: NEVER
HOW MANY STANDARD DRINKS CONTAINING ALCOHOL DO YOU HAVE ON A TYPICAL DAY: PATIENT DOES NOT DRINK

## 2025-06-16 NOTE — PROGRESS NOTES
Medicare Annual Wellness Visit    Katia Santana is here for Medicare AWV    Assessment & Plan   Pemphigus vulgaris (HCC)  -     oxyCODONE-acetaminophen (PERCOCET) 7.5-325 MG per tablet; Take 1 tablet by mouth 2 times daily for 30 days. Intended supply: 14 days Max Daily Amount: 2 tablets, Disp-60 tablet, R-0Normal  -     CBC with Auto Differential; Future  -     T4, Free; Future  -     Uric Acid; Future  -     TSH; Future  -     Hepatic Function Panel; Future  -     Basic Metabolic Panel; Future  -     Lipid Panel; Future  -     Hemoglobin A1C; Future  -     C-Reactive Protein; Future  -     NOLAN; Future  -     Vitamin D 25 Hydroxy; Future  -     PAIN MANAGEMENT PROFILE 1 W/ CONFIRMATION, URINE; Future  Primary hypertension  -     CBC with Auto Differential; Future  -     T4, Free; Future  -     Uric Acid; Future  -     TSH; Future  -     Hepatic Function Panel; Future  -     Basic Metabolic Panel; Future  -     Lipid Panel; Future  -     Hemoglobin A1C; Future  -     C-Reactive Protein; Future  -     NOLAN; Future  -     Vitamin D 25 Hydroxy; Future  -     PAIN MANAGEMENT PROFILE 1 W/ CONFIRMATION, URINE; Future  Mixed hyperlipidemia  -     CBC with Auto Differential; Future  -     T4, Free; Future  -     Uric Acid; Future  -     TSH; Future  -     Hepatic Function Panel; Future  -     Basic Metabolic Panel; Future  -     Lipid Panel; Future  -     Hemoglobin A1C; Future  -     C-Reactive Protein; Future  -     NOLAN; Future  -     Vitamin D 25 Hydroxy; Future  -     PAIN MANAGEMENT PROFILE 1 W/ CONFIRMATION, URINE; Future  Vitamin D deficiency  -     CBC with Auto Differential; Future  -     T4, Free; Future  -     Uric Acid; Future  -     TSH; Future  -     Hepatic Function Panel; Future  -     Basic Metabolic Panel; Future  -     Lipid Panel; Future  -     Hemoglobin A1C; Future  -     C-Reactive Protein; Future  -     NOLAN; Future  -     Vitamin D 25 Hydroxy; Future  -     PAIN MANAGEMENT PROFILE 1 W/ CONFIRMATION,

## 2025-07-25 ENCOUNTER — OFFICE VISIT (OUTPATIENT)
Dept: CARDIOLOGY CLINIC | Age: 60
End: 2025-07-25
Payer: COMMERCIAL

## 2025-07-25 VITALS
RESPIRATION RATE: 16 BRPM | HEIGHT: 64 IN | OXYGEN SATURATION: 94 % | SYSTOLIC BLOOD PRESSURE: 122 MMHG | TEMPERATURE: 97 F | HEART RATE: 54 BPM | WEIGHT: 203.4 LBS | BODY MASS INDEX: 34.72 KG/M2 | DIASTOLIC BLOOD PRESSURE: 76 MMHG

## 2025-07-25 DIAGNOSIS — I25.10 CORONARY ARTERY DISEASE INVOLVING NATIVE CORONARY ARTERY OF NATIVE HEART WITHOUT ANGINA PECTORIS: Primary | ICD-10-CM

## 2025-07-25 DIAGNOSIS — I10 PRIMARY HYPERTENSION: ICD-10-CM

## 2025-07-25 DIAGNOSIS — E66.9 OBESITY (BMI 30-39.9): ICD-10-CM

## 2025-07-25 DIAGNOSIS — E78.2 MIXED HYPERLIPIDEMIA: ICD-10-CM

## 2025-07-25 DIAGNOSIS — R07.2 PRECORDIAL PAIN: ICD-10-CM

## 2025-07-25 DIAGNOSIS — I25.2 HISTORY OF ST ELEVATION MYOCARDIAL INFARCTION (STEMI): ICD-10-CM

## 2025-07-25 PROCEDURE — 3078F DIAST BP <80 MM HG: CPT | Performed by: INTERNAL MEDICINE

## 2025-07-25 PROCEDURE — G8417 CALC BMI ABV UP PARAM F/U: HCPCS | Performed by: INTERNAL MEDICINE

## 2025-07-25 PROCEDURE — 3074F SYST BP LT 130 MM HG: CPT | Performed by: INTERNAL MEDICINE

## 2025-07-25 PROCEDURE — 99214 OFFICE O/P EST MOD 30 MIN: CPT | Performed by: INTERNAL MEDICINE

## 2025-07-25 PROCEDURE — 3017F COLORECTAL CA SCREEN DOC REV: CPT | Performed by: INTERNAL MEDICINE

## 2025-07-25 PROCEDURE — G8427 DOCREV CUR MEDS BY ELIG CLIN: HCPCS | Performed by: INTERNAL MEDICINE

## 2025-07-25 PROCEDURE — 93000 ELECTROCARDIOGRAM COMPLETE: CPT | Performed by: INTERNAL MEDICINE

## 2025-07-25 PROCEDURE — 1036F TOBACCO NON-USER: CPT | Performed by: INTERNAL MEDICINE

## 2025-07-25 NOTE — PROGRESS NOTES
OUTPATIENT CARDIOLOGY FOLLOW-UP    Name: Katia Santana    Age: 60 y.o.    Date of Service: 2025    Chief Complaint: CAD, history of STEMI, SOB    Referring Physician: Noe Cox DO    History of Present Illness:  Katia Santana is a 60 y.o. female who presented to [facility] on 2/15/2020 for further evaluation of progressive SOB. Her PMH is outlined in detail below (see A/P below), and includes inferior STEMI s/p staged PCI in 2020 ([employer] where patient works; see cath results below). She denies recent exertional chest pain, palpitations, or syncope. No history of PND or orthopnea. +\"profound\" shortness of breath at rest and with exertion after Brilinta started in 2020 (no improvement for ~ 3 weeks leading up to 2020 hospitalization). Brilinta switched to effient on 2/15/2020 --> SOB resolved. At the time of her 2022 office visit, she reported intermittent \"indigestion\" for > 1 year (non-exertional, episodes last minutes, mid-sternal) --> imdur added at 2022 office visit (patient no longer taking medication) as of 2024 office visit. Still with similar chest pain -- negative stress test in 2024. She is currently with no active cardiac complaints at rest. SB/SR on recent EKG's.    Review of Systems:   Cardiac: As per HPI  General: No fever, chills  Pulmonary: As per HPI  HEENT: No visual disturbances, difficult swallowing  GI: No nausea, vomiting  : No dysuria, hematuria  Endocrine: No thyroid disease or DM  Musculoskeletal: GIRON x 4, no focal motor deficits  Skin: Intact, no rashes  Neuro: No headache, seizures  Psych: Currently with no depression, anxiety    Past Medical History:  Past Medical History:   Diagnosis Date    DVT (deep venous thrombosis) ([facility]) 2019    right leg    Hyperlipidemia     Hypertension     Myocardial infarct (HCC) 2020    Myocardial infarction (HCC) 2020       Past Surgical History:  Past Surgical History:   Procedure Laterality Date

## 2025-08-12 DIAGNOSIS — L10.0 PEMPHIGUS VULGARIS (HCC): Primary | ICD-10-CM

## 2025-08-12 DIAGNOSIS — Z79.891 LONG TERM (CURRENT) USE OF OPIATE ANALGESIC: ICD-10-CM

## 2025-08-12 RX ORDER — OXYCODONE AND ACETAMINOPHEN 7.5; 325 MG/1; MG/1
1 TABLET ORAL 2 TIMES DAILY
COMMUNITY
End: 2025-08-12 | Stop reason: SDUPTHER

## 2025-08-12 RX ORDER — OXYCODONE AND ACETAMINOPHEN 7.5; 325 MG/1; MG/1
1 TABLET ORAL 2 TIMES DAILY
Qty: 60 TABLET | Refills: 0 | Status: SHIPPED | OUTPATIENT
Start: 2025-08-12 | End: 2025-09-11

## 2025-08-12 RX ORDER — ASPIRIN 81 MG/1
81 TABLET, CHEWABLE ORAL DAILY
Qty: 30 TABLET | Refills: 0 | Status: SHIPPED | OUTPATIENT
Start: 2025-08-12

## 2025-08-14 ENCOUNTER — TELEPHONE (OUTPATIENT)
Dept: PRIMARY CARE CLINIC | Age: 60
End: 2025-08-14

## (undated) DEVICE — SPONGE GZ W4XL4IN RAYON POLY FILL CVR W/ NONWOVEN FAB

## (undated) DEVICE — GRADUATE TRIANG MEASURE 1000ML BLK PRNT